# Patient Record
Sex: MALE | Race: WHITE | NOT HISPANIC OR LATINO | URBAN - METROPOLITAN AREA
[De-identification: names, ages, dates, MRNs, and addresses within clinical notes are randomized per-mention and may not be internally consistent; named-entity substitution may affect disease eponyms.]

---

## 2023-04-13 ENCOUNTER — INPATIENT (INPATIENT)
Facility: HOSPITAL | Age: 63
LOS: 4 days | Discharge: HOME HEALTH | DRG: 235 | End: 2023-04-18
Attending: THORACIC SURGERY (CARDIOTHORACIC VASCULAR SURGERY) | Admitting: THORACIC SURGERY (CARDIOTHORACIC VASCULAR SURGERY)
Payer: MEDICARE

## 2023-04-13 ENCOUNTER — TRANSCRIPTION ENCOUNTER (OUTPATIENT)
Age: 63
End: 2023-04-13

## 2023-04-13 VITALS
SYSTOLIC BLOOD PRESSURE: 160 MMHG | RESPIRATION RATE: 16 BRPM | OXYGEN SATURATION: 100 % | DIASTOLIC BLOOD PRESSURE: 96 MMHG

## 2023-04-13 LAB
A1C WITH ESTIMATED AVERAGE GLUCOSE RESULT: 5.8 % — HIGH (ref 4–5.6)
ALBUMIN SERPL ELPH-MCNC: 4.1 G/DL — SIGNIFICANT CHANGE UP (ref 3.3–5)
ALP SERPL-CCNC: 55 U/L — SIGNIFICANT CHANGE UP (ref 40–120)
ALT FLD-CCNC: 28 U/L — SIGNIFICANT CHANGE UP (ref 10–45)
ANION GAP SERPL CALC-SCNC: 11 MMOL/L — SIGNIFICANT CHANGE UP (ref 5–17)
APTT BLD: 26.7 SEC — LOW (ref 27.5–35.5)
AST SERPL-CCNC: 18 U/L — SIGNIFICANT CHANGE UP (ref 10–40)
BASOPHILS # BLD AUTO: 0.05 K/UL — SIGNIFICANT CHANGE UP (ref 0–0.2)
BASOPHILS NFR BLD AUTO: 0.5 % — SIGNIFICANT CHANGE UP (ref 0–2)
BILIRUB SERPL-MCNC: 0.3 MG/DL — SIGNIFICANT CHANGE UP (ref 0.2–1.2)
BUN SERPL-MCNC: 16 MG/DL — SIGNIFICANT CHANGE UP (ref 7–23)
CALCIUM SERPL-MCNC: 9.4 MG/DL — SIGNIFICANT CHANGE UP (ref 8.4–10.5)
CHLORIDE SERPL-SCNC: 102 MMOL/L — SIGNIFICANT CHANGE UP (ref 96–108)
CHOLEST SERPL-MCNC: 137 MG/DL — SIGNIFICANT CHANGE UP
CK MB CFR SERPL CALC: 2 NG/ML — SIGNIFICANT CHANGE UP (ref 0–6.7)
CK SERPL-CCNC: 81 U/L — SIGNIFICANT CHANGE UP (ref 30–200)
CO2 SERPL-SCNC: 24 MMOL/L — SIGNIFICANT CHANGE UP (ref 22–31)
CREAT SERPL-MCNC: 1.13 MG/DL — SIGNIFICANT CHANGE UP (ref 0.5–1.3)
EGFR: 73 ML/MIN/1.73M2 — SIGNIFICANT CHANGE UP
EOSINOPHIL # BLD AUTO: 0.03 K/UL — SIGNIFICANT CHANGE UP (ref 0–0.5)
EOSINOPHIL NFR BLD AUTO: 0.3 % — SIGNIFICANT CHANGE UP (ref 0–6)
ESTIMATED AVERAGE GLUCOSE: 120 MG/DL — HIGH (ref 68–114)
GLUCOSE SERPL-MCNC: 146 MG/DL — HIGH (ref 70–99)
HCT VFR BLD CALC: 38.7 % — LOW (ref 39–50)
HDLC SERPL-MCNC: 42 MG/DL — SIGNIFICANT CHANGE UP
HGB BLD-MCNC: 13.2 G/DL — SIGNIFICANT CHANGE UP (ref 13–17)
IMM GRANULOCYTES NFR BLD AUTO: 0.3 % — SIGNIFICANT CHANGE UP (ref 0–0.9)
INR BLD: 1.04 — SIGNIFICANT CHANGE UP (ref 0.88–1.16)
LIPID PNL WITH DIRECT LDL SERPL: 66 MG/DL — SIGNIFICANT CHANGE UP
LYMPHOCYTES # BLD AUTO: 1.51 K/UL — SIGNIFICANT CHANGE UP (ref 1–3.3)
LYMPHOCYTES # BLD AUTO: 15.7 % — SIGNIFICANT CHANGE UP (ref 13–44)
MAGNESIUM SERPL-MCNC: 2.2 MG/DL — SIGNIFICANT CHANGE UP (ref 1.6–2.6)
MCHC RBC-ENTMCNC: 31.3 PG — SIGNIFICANT CHANGE UP (ref 27–34)
MCHC RBC-ENTMCNC: 34.1 GM/DL — SIGNIFICANT CHANGE UP (ref 32–36)
MCV RBC AUTO: 91.7 FL — SIGNIFICANT CHANGE UP (ref 80–100)
MONOCYTES # BLD AUTO: 0.76 K/UL — SIGNIFICANT CHANGE UP (ref 0–0.9)
MONOCYTES NFR BLD AUTO: 7.9 % — SIGNIFICANT CHANGE UP (ref 2–14)
NEUTROPHILS # BLD AUTO: 7.22 K/UL — SIGNIFICANT CHANGE UP (ref 1.8–7.4)
NEUTROPHILS NFR BLD AUTO: 75.3 % — SIGNIFICANT CHANGE UP (ref 43–77)
NON HDL CHOLESTEROL: 95 MG/DL — SIGNIFICANT CHANGE UP
NRBC # BLD: 0 /100 WBCS — SIGNIFICANT CHANGE UP (ref 0–0)
NT-PROBNP SERPL-SCNC: 134 PG/ML — SIGNIFICANT CHANGE UP (ref 0–300)
PA ADP PRP-ACNC: 247 PRU — SIGNIFICANT CHANGE UP (ref 194–418)
PHOSPHATE SERPL-MCNC: 3.2 MG/DL — SIGNIFICANT CHANGE UP (ref 2.5–4.5)
PLATELET # BLD AUTO: 323 K/UL — SIGNIFICANT CHANGE UP (ref 150–400)
POTASSIUM SERPL-MCNC: 4 MMOL/L — SIGNIFICANT CHANGE UP (ref 3.5–5.3)
POTASSIUM SERPL-SCNC: 4 MMOL/L — SIGNIFICANT CHANGE UP (ref 3.5–5.3)
PROT SERPL-MCNC: 7.1 G/DL — SIGNIFICANT CHANGE UP (ref 6–8.3)
PROTHROM AB SERPL-ACNC: 12.4 SEC — SIGNIFICANT CHANGE UP (ref 10.5–13.4)
RBC # BLD: 4.22 M/UL — SIGNIFICANT CHANGE UP (ref 4.2–5.8)
RBC # FLD: 12.8 % — SIGNIFICANT CHANGE UP (ref 10.3–14.5)
RH IG SCN BLD-IMP: POSITIVE — SIGNIFICANT CHANGE UP
RH IG SCN BLD-IMP: POSITIVE — SIGNIFICANT CHANGE UP
SODIUM SERPL-SCNC: 137 MMOL/L — SIGNIFICANT CHANGE UP (ref 135–145)
TRIGL SERPL-MCNC: 146 MG/DL — SIGNIFICANT CHANGE UP
TROPONIN T SERPL-MCNC: 0.01 NG/ML — SIGNIFICANT CHANGE UP (ref 0–0.01)
WBC # BLD: 9.6 K/UL — SIGNIFICANT CHANGE UP (ref 3.8–10.5)
WBC # FLD AUTO: 9.6 K/UL — SIGNIFICANT CHANGE UP (ref 3.8–10.5)

## 2023-04-13 PROCEDURE — 99223 1ST HOSP IP/OBS HIGH 75: CPT | Mod: 57

## 2023-04-13 PROCEDURE — 71045 X-RAY EXAM CHEST 1 VIEW: CPT | Mod: 26

## 2023-04-13 PROCEDURE — 93010 ELECTROCARDIOGRAM REPORT: CPT | Mod: 77

## 2023-04-13 RX ORDER — CHLORHEXIDINE GLUCONATE 213 G/1000ML
1 SOLUTION TOPICAL ONCE
Refills: 0 | Status: COMPLETED | OUTPATIENT
Start: 2023-04-13 | End: 2023-04-14

## 2023-04-13 RX ORDER — CHLORHEXIDINE GLUCONATE 213 G/1000ML
1 SOLUTION TOPICAL ONCE
Refills: 0 | Status: COMPLETED | OUTPATIENT
Start: 2023-04-13 | End: 2023-04-13

## 2023-04-13 RX ORDER — SODIUM CHLORIDE 9 MG/ML
3 INJECTION INTRAMUSCULAR; INTRAVENOUS; SUBCUTANEOUS EVERY 8 HOURS
Refills: 0 | Status: DISCONTINUED | OUTPATIENT
Start: 2023-04-13 | End: 2023-04-14

## 2023-04-13 RX ORDER — CHLORHEXIDINE GLUCONATE 213 G/1000ML
30 SOLUTION TOPICAL ONCE
Refills: 0 | Status: DISCONTINUED | OUTPATIENT
Start: 2023-04-13 | End: 2023-04-14

## 2023-04-13 RX ADMIN — SODIUM CHLORIDE 3 MILLILITER(S): 9 INJECTION INTRAMUSCULAR; INTRAVENOUS; SUBCUTANEOUS at 23:27

## 2023-04-13 RX ADMIN — CHLORHEXIDINE GLUCONATE 1 APPLICATION(S): 213 SOLUTION TOPICAL at 23:29

## 2023-04-13 NOTE — H&P ADULT - ASSESSMENT
Neurovascular: No delirium. Pain well controlled with current regimen.  -PRN's.    Cardiovascular: Hemodynamically stable. HR controlled.  -BP. HR. EKG. TTE. Cardiac Panel. Lipid Panel. BNP.   -ASA. Plavix. BBlocker. Statin.      Respiratory: 02 Sat = 98% on RA.  -If on oxygen wean to RA from for O2 Sat > 93%.  -Encourage C+DB and Use of IS 10x / hr while awake.  -CXR.    GI: Stable.  -NPO after MN.  -PPX.  -PO Diet.    Renal / :  -Monitor renal function.  -Monitor I/O's.    Endocrine:    -A1c.  -TSH.    Hematologic:  -CBC.  -Coagulation Panel.  -T/Sx2.    ID:  -Temperature.  -CBC.  -Observe for SIRS/Sepsis Syndrome.    Prophylaxis:  -DVT prophylaxis with 5000 SubQ Heparin q8h.  -SCD's    Disposition:  -9L for frequent monitoring.  Patient is a 63 year male with history of HTN, HLD, SVT, systolic CHF with improved LVEF, CAD s/p PCI LPDA in June 2020 who was sent to the ER at [facility] on 04/13/2023 by his cardiologist for having a positive stress test. Patient explains he has been having intermittent DOCKERY for the last few weeks. He also states he has had some mild chest discomfort which he attributed to dyspepsia as it is sometimes accompanied by a burning in his stomach. During the stress test in office today patient developed limiting pain in his shoulders and back and was found to have profound ST depressions. Cardiac Catheterization completed showing >90% proximal LAD stenosis at a trifurcation with a high-rising D1, and distal left main with moderate disease. Mild LCx. Non-dominant RCA. Arterial hemostasis with 6FR Perclose. Patient now transferred to [facility] for further work up and MGMT under the care of Dr. Hernandez. Patient currently without complaints    Neurovascular: No delirium. Pain well controlled with current regimen.  -PRN's.    Cardiovascular: Hemodynamically stable. HR controlled. CAD. CAD s/p PCI x1 to LPDA (June 2020). Systolic CHF- with improved LVEF (11/2021- LVEF 50-55%). SVT/ PVC's. HTN. HLD.   -PST.  -Amlodipine 10 Mg PO QPM   -Isordil 5 Mg PO TID   - Hold Hydralazine 25 Mg PO TID   -Atorvastatin 40 Mg PO QHS   -Spironolactone 25 Mg PO QAM   -Metoprolol  25 Mg PO q12h   -Aspirin 81 Mg PO Q BREAKFAST     Respiratory: 02 Sat = 98% on RA.  -If on oxygen wean to RA from for O2 Sat > 93%.  -Encourage C+DB and Use of IS 10x / hr while awake.  -CXR.    GI: Stable.  -NPO after MN.  -PPX.  -PO Diet.    Renal / :  -Monitor renal function.  -Monitor I/O's.    Endocrine:    -A1c.  -TSH.    Hematologic:  -CBC.  -Coagulation Panel.  -T/Sx2.    ID:  -Temperature.  -CBC.  -Observe for SIRS/Sepsis Syndrome.    Prophylaxis:  -DVT prophylaxis with 5000 SubQ Heparin q8h.  -SCD's.    Disposition:  -9L for frequent monitoring.

## 2023-04-13 NOTE — H&P ADULT - NSHPPHYSICALEXAM_GEN_ALL_CORE
Vital Signs  T(C): 37.1 (13 Apr 2023 22:28), Max: 37.1 (13 Apr 2023 22:28)  T(F): 98.8 (13 Apr 2023 22:28), Max: 98.8 (13 Apr 2023 22:28)  HR: 86 (13 Apr 2023 22:06) (86-86)  BP: 160/96 (13 Apr 2023 22:06) (160/96 - 160/96)  RR: 16 (13 Apr 2023 22:06) (16 - 16)  SpO2: 100% (13 Apr 2023 22:06) (100% - 100%)    O2 Parameters below as of 13 Apr 2023 22:06  Patient On (Oxygen Delivery Method): room air      Appearance: Normal	  HEENT:   Normal oral mucosa, PERRL, EOMI	  Neck: Supple, + JVD/ - JVD; Carotid Bruit   Cardiovascular: Normal S1 S2. No JVD. No murmurs.  Respiratory: Lungs clear to auscultation/Decreased Breath Sounds. No Rales, Rhonchi, Wheezing.	  Gastrointestinal:  Soft, non-tender, + BS.	  Skin: No rashes. No ecchymoses. No cyanosis.  Extremities: Normal range of motion, no clubbing, cyanosis or edema.  Vascular: Peripheral pulses palpable 2+ bilaterally.  Neurologic: Non-focal.  Psychiatry: A & O x 3, mood & affect appropriate. Vital Signs  T(C): 37.1 (13 Apr 2023 22:28), Max: 37.1 (13 Apr 2023 22:28)  T(F): 98.8 (13 Apr 2023 22:28), Max: 98.8 (13 Apr 2023 22:28)  HR: 86 (13 Apr 2023 22:06) (86-86)  BP: 160/96 (13 Apr 2023 22:06) (160/96 - 160/96)  RR: 16 (13 Apr 2023 22:06) (16 - 16)  SpO2: 100% (13 Apr 2023 22:06) (100% - 100%)    O2 Parameters below as of 13 Apr 2023 22:06  Patient On (Oxygen Delivery Method): room air    Appearance: Normal	  HEENT:   Normal oral mucosa, PERRL, EOMI	  Neck: Supple, - JVD; Carotid Bruit   Cardiovascular: Normal S1 S2. No JVD. ? murmurs.  Respiratory: Lungs clear to auscultation B/L. No Rales, Rhonchi, Wheezing.	  Gastrointestinal:  Soft, non-tender, + BS.	  Skin: No rashes. No ecchymoses. No cyanosis.  Extremities: Normal range of motion, no clubbing, cyanosis or edema.  Vascular: Peripheral pulses palpable 2+ bilaterally.  Neurologic: Non-focal.  Psychiatry: A & O x 3, mood & affect appropriate.     Appearance: Normal	  HEENT:   Normal oral mucosa, PERRL, EOMI	  Neck: Supple, + JVD/ - JVD; Carotid Bruit   Cardiovascular: Normal S1 S2. No JVD. No murmurs.  Respiratory: Lungs clear to auscultation/Decreased Breath Sounds. No Rales, Rhonchi, Wheezing.	  Gastrointestinal:  Soft, non-tender, + BS.	  Skin: No rashes. No ecchymoses. No cyanosis.  Extremities: Normal range of motion, no clubbing, cyanosis or edema.  Vascular: Peripheral pulses palpable 2+ bilaterally.  Neurologic: Non-focal.  Psychiatry: A & O x 3, mood & affect appropriate.

## 2023-04-13 NOTE — H&P ADULT - NSHPREVIEWOFSYSTEMS_GEN_ALL_CORE
CONSTITUTIONAL: No fevers / chills, sweats, fatigue, weight loss, weight gain  NEUROLOGICAL: No parathesias, seizures, syncope, confusion  EYES: No blurry vision, discharge, pain, loss of vision  ENMT: No difficulty hearing, vertigo, dysphagia, epistaxis, recent dental work  CV: No chest pain, palpitations, DOCKERY, orthopnea  RESPIRATORY:  No wheezing, SOB, cough / sputum, hemoptysis  GI: No nausea, vomiting, diarrhea, constipation, melena  : No hematuria, dysuria, urgency, incontinence  MUSCULOSKELETAL: No arthritis, joint swelling, muscle weakness  SKIN/BREAST: No rash, itching, hair loss, masses  PSYCHIATRY: No depression, anxiety, suicidal ideation  HEMATOLOGY:  No bruising easily, enlarged lymph nodes, tender lymph nodes  ENDOCRINE: No cold intolerance, heat intolerance, polydipsia CONSTITUTIONAL: No fevers / chills, sweats, fatigue, weight loss, weight gain  NEUROLOGICAL: No parathesias, seizures, syncope, confusion  EYES: No blurry vision, discharge, pain, loss of vision  ENMT: No difficulty hearing, vertigo, dysphagia, epistaxis, recent dental work  CV: No current chest pain, palpitations, DOCKERY, orthopnea  RESPIRATORY:  No wheezing, SOB, cough / sputum, hemoptysis  GI: No nausea, vomiting, diarrhea, constipation, melena  : No hematuria, dysuria, urgency, incontinence  MUSCULOSKELETAL: No arthritis, joint swelling, muscle weakness  SKIN/BREAST: No rash, itching, hair loss, masses  PSYCHIATRY: No depression, anxiety, suicidal ideation  HEMATOLOGY:  No bruising easily, enlarged lymph nodes, tender lymph nodes  ENDOCRINE: No cold intolerance, heat intolerance, polydipsia

## 2023-04-13 NOTE — H&P ADULT - HISTORY OF PRESENT ILLNESS
Patient is a 63 year male with history of HTN, HLD, SVT, systolic CHF with improved LVEF, CAD s/p PCI LPDA in June 2020 who was sent to the ER at [facility] on 04/13/2023 by his cardiologist for having a positive stress test. Patient explains he has been having intermittent DOCKERY for the last few weeks. He also states he has had some mild chest discomfort which he attributed to dyspepsia as it is sometimes accompanied by a burning in his stomach. During the stress test in office today patient developed limiting pain in his shoulders and back and was found to have profound ST depressions. Cardiac Catheterization completed showing >90% proximal LAD stenosis at a trifurcation with a high-rising D1, and distal left main with moderate  disease. Mild LCx. Non-dominant RCA. Arterial hemostasis with 6FR Perclose. Pateint now transfered to [facility] for further work upm and MGMT under the care of Dr. Hernandez.     Other Imaging:   Exercise stress test 04/13/23: Patient exercised for 4 minutes 45 seconds of the Gregorio protocol   developed limiting pain in his shoulders and back. He had profoundgreater than 3 mm downsloping ST depressions.   TTE 12/20/2022: Normal LV size/function, mild AR/MR, mild thoracic ectasia 4.2cm   EKG 4/13/2023: NSR 81bpm, poor R wave progression     PMH:   CAD s/p PCI x1 to LPDA (June 2020)   Systolic CHF- with improved LVEF (11/2021- LVEF 50-55%)   SVT/ PVC's   HTN   HLD   GERD with a h/o GI bleed s/p endovascular clipping (received blood   transfussion)     PSH:   None     Social History:   No Smoking; Alcohol - Socially. No IVRDU.     Family History:   Mother - MI age 48, Siblings - brother: MI at age 42, Other - grandfather: MI age 62     Home Rx.:  Amlodipine 10 Mg PO QPM   Isordil 5 Mg PO TID   Hydralazine 25 Mg PO TID   Atorvastatin 40 Mg PO QHS   Pantoprazole 40 Mg PO BID   Spironolactone 25 Mg PO QAM   Metoprolol XL 25 Mg PO QAM   Aspirin 81 Mg PO Q BREAKFAST     Coded Allergies:   Lisinopril (Severe, Angioedema)    Patient is a 63 year male with history of HTN, HLD, SVT, systolic CHF with improved LVEF, CAD s/p PCI LPDA in June 2020 who was sent to the ER at [facility] on 04/13/2023 by his cardiologist for having a positive stress test. Patient explains he has been having intermittent DOCKERY for the last few weeks. He also states he has had some mild chest discomfort which he attributed to dyspepsia as it is sometimes accompanied by a burning in his stomach. During the stress test in office today patient developed limiting pain in his shoulders and back and was found to have profound ST depressions. Cardiac Catheterization completed showing >90% proximal LAD stenosis at a trifurcation with a high-rising D1, and distal left main with moderate disease. Mild LCx. Non-dominant RCA. Arterial hemostasis with 6FR Perclose. Patient now transferred to [facility] for further work up and MGMT under the care of Dr. Hernandez. Patient currently without complaints.     Other Imaging:   Exercise stress test 04/13/23: Patient exercised for 4 minutes 45 seconds of the Gregorio protocol   developed limiting pain in his shoulders and back. He had profound greater than 3 mm downsloping ST depressions.   TTE 12/20/2022: Normal LV size/function, mild AR/MR, mild thoracic ectasia 4.2cm   EKG 4/13/2023: NSR 81bpm, poor R wave progression     PMH:   CAD s/p PCI x1 to LPDA (June 2020)   Systolic CHF- with improved LVEF (11/2021- LVEF 50-55%)   SVT/ PVC's   HTN   HLD   GERD with a h/o GI bleed s/p endovascular clipping (received blood transfusion)  PSH:   None     Social History:   No Smoking; Alcohol - Socially. No IVRDU. Lives alone.     Family History:   Mother - MI age 48, Siblings - brother: MI at age 42, Other - grandfather: MI age 62     Home Rx.:  Amlodipine 10 Mg PO QPM   Isordil 5 Mg PO TID   Hydralazine 25 Mg PO TID   Atorvastatin 40 Mg PO QHS   Pantoprazole 40 Mg PO BID   Spironolactone 25 Mg PO QAM   Metoprolol XL 25 Mg PO QAM   Aspirin 81 Mg PO Q BREAKFAST     Coded Allergies:   Lisinopril (Severe, Angioedema)

## 2023-04-14 ENCOUNTER — APPOINTMENT (OUTPATIENT)
Dept: CARDIOTHORACIC SURGERY | Facility: HOSPITAL | Age: 63
End: 2023-04-14

## 2023-04-14 PROBLEM — Z00.00 ENCOUNTER FOR PREVENTIVE HEALTH EXAMINATION: Status: ACTIVE | Noted: 2023-04-14

## 2023-04-14 LAB
ALBUMIN SERPL ELPH-MCNC: 3.1 G/DL — LOW (ref 3.3–5)
ALBUMIN SERPL ELPH-MCNC: 3.8 G/DL — SIGNIFICANT CHANGE UP (ref 3.3–5)
ALBUMIN SERPL ELPH-MCNC: 4.3 G/DL — SIGNIFICANT CHANGE UP (ref 3.3–5)
ALP SERPL-CCNC: 43 U/L — SIGNIFICANT CHANGE UP (ref 40–120)
ALP SERPL-CCNC: 54 U/L — SIGNIFICANT CHANGE UP (ref 40–120)
ALP SERPL-CCNC: 62 U/L — SIGNIFICANT CHANGE UP (ref 40–120)
ALT FLD-CCNC: 18 U/L — SIGNIFICANT CHANGE UP (ref 10–45)
ALT FLD-CCNC: 25 U/L — SIGNIFICANT CHANGE UP (ref 10–45)
ALT FLD-CCNC: 28 U/L — SIGNIFICANT CHANGE UP (ref 10–45)
ANION GAP SERPL CALC-SCNC: 13 MMOL/L — SIGNIFICANT CHANGE UP (ref 5–17)
ANION GAP SERPL CALC-SCNC: 7 MMOL/L — SIGNIFICANT CHANGE UP (ref 5–17)
ANION GAP SERPL CALC-SCNC: 9 MMOL/L — SIGNIFICANT CHANGE UP (ref 5–17)
APPEARANCE UR: CLEAR — SIGNIFICANT CHANGE UP
APTT BLD: 26.5 SEC — LOW (ref 27.5–35.5)
APTT BLD: 28.2 SEC — SIGNIFICANT CHANGE UP (ref 27.5–35.5)
APTT BLD: 29.4 SEC — SIGNIFICANT CHANGE UP (ref 27.5–35.5)
AST SERPL-CCNC: 14 U/L — SIGNIFICANT CHANGE UP (ref 10–40)
AST SERPL-CCNC: 17 U/L — SIGNIFICANT CHANGE UP (ref 10–40)
AST SERPL-CCNC: 19 U/L — SIGNIFICANT CHANGE UP (ref 10–40)
BASE EXCESS BLDA CALC-SCNC: -0.4 MMOL/L — SIGNIFICANT CHANGE UP (ref -2–3)
BASE EXCESS BLDA CALC-SCNC: -2.3 MMOL/L — LOW (ref -2–3)
BASE EXCESS BLDA CALC-SCNC: -2.7 MMOL/L — LOW (ref -2–3)
BASE EXCESS BLDA CALC-SCNC: -3.4 MMOL/L — LOW (ref -2–3)
BASOPHILS # BLD AUTO: 0.05 K/UL — SIGNIFICANT CHANGE UP (ref 0–0.2)
BASOPHILS NFR BLD AUTO: 0.3 % — SIGNIFICANT CHANGE UP (ref 0–2)
BILIRUB SERPL-MCNC: 0.3 MG/DL — SIGNIFICANT CHANGE UP (ref 0.2–1.2)
BILIRUB SERPL-MCNC: 0.5 MG/DL — SIGNIFICANT CHANGE UP (ref 0.2–1.2)
BILIRUB SERPL-MCNC: 0.6 MG/DL — SIGNIFICANT CHANGE UP (ref 0.2–1.2)
BILIRUB UR-MCNC: NEGATIVE — SIGNIFICANT CHANGE UP
BLD GP AB SCN SERPL QL: NEGATIVE — SIGNIFICANT CHANGE UP
BUN SERPL-MCNC: 16 MG/DL — SIGNIFICANT CHANGE UP (ref 7–23)
BUN SERPL-MCNC: 16 MG/DL — SIGNIFICANT CHANGE UP (ref 7–23)
BUN SERPL-MCNC: 23 MG/DL — SIGNIFICANT CHANGE UP (ref 7–23)
CA-I BLDA-SCNC: 1.16 MMOL/L — SIGNIFICANT CHANGE UP (ref 1.15–1.33)
CA-I BLDA-SCNC: 1.17 MMOL/L — SIGNIFICANT CHANGE UP (ref 1.15–1.33)
CA-I BLDA-SCNC: 1.17 MMOL/L — SIGNIFICANT CHANGE UP (ref 1.15–1.33)
CA-I BLDA-SCNC: 1.22 MMOL/L — SIGNIFICANT CHANGE UP (ref 1.15–1.33)
CALCIUM SERPL-MCNC: 8 MG/DL — LOW (ref 8.4–10.5)
CALCIUM SERPL-MCNC: 8.6 MG/DL — SIGNIFICANT CHANGE UP (ref 8.4–10.5)
CALCIUM SERPL-MCNC: 9.6 MG/DL — SIGNIFICANT CHANGE UP (ref 8.4–10.5)
CHLORIDE SERPL-SCNC: 102 MMOL/L — SIGNIFICANT CHANGE UP (ref 96–108)
CHLORIDE SERPL-SCNC: 104 MMOL/L — SIGNIFICANT CHANGE UP (ref 96–108)
CHLORIDE SERPL-SCNC: 105 MMOL/L — SIGNIFICANT CHANGE UP (ref 96–108)
CO2 BLDA-SCNC: 22 MMOL/L — SIGNIFICANT CHANGE UP (ref 19–24)
CO2 BLDA-SCNC: 24 MMOL/L — SIGNIFICANT CHANGE UP (ref 19–24)
CO2 BLDA-SCNC: 24 MMOL/L — SIGNIFICANT CHANGE UP (ref 19–24)
CO2 BLDA-SCNC: 27 MMOL/L — HIGH (ref 19–24)
CO2 SERPL-SCNC: 21 MMOL/L — LOW (ref 22–31)
CO2 SERPL-SCNC: 22 MMOL/L — SIGNIFICANT CHANGE UP (ref 22–31)
CO2 SERPL-SCNC: 24 MMOL/L — SIGNIFICANT CHANGE UP (ref 22–31)
COHGB MFR BLDA: 0.5 % — SIGNIFICANT CHANGE UP
COHGB MFR BLDA: 0.6 % — SIGNIFICANT CHANGE UP
COHGB MFR BLDA: 0.7 % — SIGNIFICANT CHANGE UP
COHGB MFR BLDA: 1 % — SIGNIFICANT CHANGE UP
COLOR SPEC: YELLOW — SIGNIFICANT CHANGE UP
CREAT SERPL-MCNC: 1 MG/DL — SIGNIFICANT CHANGE UP (ref 0.5–1.3)
CREAT SERPL-MCNC: 1.06 MG/DL — SIGNIFICANT CHANGE UP (ref 0.5–1.3)
CREAT SERPL-MCNC: 1.18 MG/DL — SIGNIFICANT CHANGE UP (ref 0.5–1.3)
DIFF PNL FLD: NEGATIVE — SIGNIFICANT CHANGE UP
EGFR: 70 ML/MIN/1.73M2 — SIGNIFICANT CHANGE UP
EGFR: 79 ML/MIN/1.73M2 — SIGNIFICANT CHANGE UP
EGFR: 85 ML/MIN/1.73M2 — SIGNIFICANT CHANGE UP
EOSINOPHIL # BLD AUTO: 0.03 K/UL — SIGNIFICANT CHANGE UP (ref 0–0.5)
EOSINOPHIL NFR BLD AUTO: 0.2 % — SIGNIFICANT CHANGE UP (ref 0–6)
GAS PNL BLDA: SIGNIFICANT CHANGE UP
GLUCOSE BLDA-MCNC: 127 MG/DL — HIGH (ref 70–99)
GLUCOSE BLDA-MCNC: 165 MG/DL — HIGH (ref 70–99)
GLUCOSE BLDA-MCNC: 167 MG/DL — HIGH (ref 70–99)
GLUCOSE BLDA-MCNC: 172 MG/DL — HIGH (ref 70–99)
GLUCOSE BLDC GLUCOMTR-MCNC: 143 MG/DL — HIGH (ref 70–99)
GLUCOSE SERPL-MCNC: 123 MG/DL — HIGH (ref 70–99)
GLUCOSE SERPL-MCNC: 131 MG/DL — HIGH (ref 70–99)
GLUCOSE SERPL-MCNC: 215 MG/DL — HIGH (ref 70–99)
GLUCOSE UR QL: NEGATIVE — SIGNIFICANT CHANGE UP
HCO3 BLDA-SCNC: 21 MMOL/L — SIGNIFICANT CHANGE UP (ref 21–28)
HCO3 BLDA-SCNC: 22 MMOL/L — SIGNIFICANT CHANGE UP (ref 21–28)
HCO3 BLDA-SCNC: 23 MMOL/L — SIGNIFICANT CHANGE UP (ref 21–28)
HCO3 BLDA-SCNC: 25 MMOL/L — SIGNIFICANT CHANGE UP (ref 21–28)
HCT VFR BLD CALC: 32.6 % — LOW (ref 39–50)
HCT VFR BLD CALC: 37.8 % — LOW (ref 39–50)
HCT VFR BLD CALC: 43 % — SIGNIFICANT CHANGE UP (ref 39–50)
HCV AB S/CO SERPL IA: 0.1 S/CO — SIGNIFICANT CHANGE UP (ref 0–0.99)
HCV AB SERPL-IMP: SIGNIFICANT CHANGE UP
HGB BLD-MCNC: 10.5 G/DL — LOW (ref 13–17)
HGB BLD-MCNC: 12.7 G/DL — LOW (ref 13–17)
HGB BLD-MCNC: 14.2 G/DL — SIGNIFICANT CHANGE UP (ref 13–17)
HGB BLDA-MCNC: 12.3 G/DL — LOW (ref 12.6–17.4)
HGB BLDA-MCNC: 12.3 G/DL — LOW (ref 12.6–17.4)
HGB BLDA-MCNC: 12.6 G/DL — SIGNIFICANT CHANGE UP (ref 12.6–17.4)
HGB BLDA-MCNC: 12.7 G/DL — SIGNIFICANT CHANGE UP (ref 12.6–17.4)
IMM GRANULOCYTES NFR BLD AUTO: 0.9 % — SIGNIFICANT CHANGE UP (ref 0–0.9)
INR BLD: 1.05 — SIGNIFICANT CHANGE UP (ref 0.88–1.16)
INR BLD: 1.09 — SIGNIFICANT CHANGE UP (ref 0.88–1.16)
INR BLD: 1.26 — HIGH (ref 0.88–1.16)
ISTAT ARTERIAL BE: -5 MMOL/L — LOW (ref -2–3)
ISTAT ARTERIAL BE: -8 MMOL/L — LOW (ref -2–3)
ISTAT ARTERIAL GLUCOSE: 107 MG/DL — HIGH (ref 70–99)
ISTAT ARTERIAL GLUCOSE: 121 MG/DL — HIGH (ref 70–99)
ISTAT ARTERIAL HCO3: 20 MMOL/L — LOW (ref 22–26)
ISTAT ARTERIAL HCO3: 22 MMOL/L — SIGNIFICANT CHANGE UP (ref 22–26)
ISTAT ARTERIAL HEMATOCRIT: 29 % — LOW (ref 39–50)
ISTAT ARTERIAL HEMATOCRIT: 37 % — LOW (ref 39–50)
ISTAT ARTERIAL HEMOGLOBIN: 12.6 G/DL — LOW (ref 13–17)
ISTAT ARTERIAL HEMOGLOBIN: 9.9 G/DL — LOW (ref 13–17)
ISTAT ARTERIAL IONIZED CALCIUM: 1.13 MMOL/L — SIGNIFICANT CHANGE UP (ref 1.12–1.3)
ISTAT ARTERIAL IONIZED CALCIUM: 1.19 MMOL/L — SIGNIFICANT CHANGE UP (ref 1.12–1.3)
ISTAT ARTERIAL PCO2: 45 MMHG — SIGNIFICANT CHANGE UP (ref 35–45)
ISTAT ARTERIAL PCO2: 55 MMHG — HIGH (ref 35–45)
ISTAT ARTERIAL PH: 7.18 — CRITICAL LOW (ref 7.35–7.45)
ISTAT ARTERIAL PH: 7.29 — LOW (ref 7.35–7.45)
ISTAT ARTERIAL PO2: 75 MMHG — LOW (ref 80–105)
ISTAT ARTERIAL PO2: <66 MMHG — LOW (ref 80–105)
ISTAT ARTERIAL POTASSIUM: 4 MMOL/L — SIGNIFICANT CHANGE UP (ref 3.5–5.3)
ISTAT ARTERIAL POTASSIUM: 4.3 MMOL/L — SIGNIFICANT CHANGE UP (ref 3.5–5.3)
ISTAT ARTERIAL SO2: 86 % — LOW (ref 95–98)
ISTAT ARTERIAL SO2: 93 % — LOW (ref 95–98)
ISTAT ARTERIAL SODIUM: 138 MMOL/L — SIGNIFICANT CHANGE UP (ref 135–145)
ISTAT ARTERIAL SODIUM: 140 MMOL/L — SIGNIFICANT CHANGE UP (ref 135–145)
ISTAT ARTERIAL TCO2: 22 MMOL/L — SIGNIFICANT CHANGE UP (ref 22–31)
ISTAT ARTERIAL TCO2: 23 MMOL/L — SIGNIFICANT CHANGE UP (ref 22–31)
KETONES UR-MCNC: NEGATIVE — SIGNIFICANT CHANGE UP
LEUKOCYTE ESTERASE UR-ACNC: NEGATIVE — SIGNIFICANT CHANGE UP
LYMPHOCYTES # BLD AUTO: 1.33 K/UL — SIGNIFICANT CHANGE UP (ref 1–3.3)
LYMPHOCYTES # BLD AUTO: 6.7 % — LOW (ref 13–44)
MAGNESIUM SERPL-MCNC: 1.6 MG/DL — SIGNIFICANT CHANGE UP (ref 1.6–2.6)
MAGNESIUM SERPL-MCNC: 1.7 MG/DL — SIGNIFICANT CHANGE UP (ref 1.6–2.6)
MAGNESIUM SERPL-MCNC: 2.2 MG/DL — SIGNIFICANT CHANGE UP (ref 1.6–2.6)
MCHC RBC-ENTMCNC: 31.1 PG — SIGNIFICANT CHANGE UP (ref 27–34)
MCHC RBC-ENTMCNC: 31.2 PG — SIGNIFICANT CHANGE UP (ref 27–34)
MCHC RBC-ENTMCNC: 31.4 PG — SIGNIFICANT CHANGE UP (ref 27–34)
MCHC RBC-ENTMCNC: 32.2 GM/DL — SIGNIFICANT CHANGE UP (ref 32–36)
MCHC RBC-ENTMCNC: 33 GM/DL — SIGNIFICANT CHANGE UP (ref 32–36)
MCHC RBC-ENTMCNC: 33.6 GM/DL — SIGNIFICANT CHANGE UP (ref 32–36)
MCV RBC AUTO: 93.6 FL — SIGNIFICANT CHANGE UP (ref 80–100)
MCV RBC AUTO: 94.1 FL — SIGNIFICANT CHANGE UP (ref 80–100)
MCV RBC AUTO: 96.7 FL — SIGNIFICANT CHANGE UP (ref 80–100)
METHGB MFR BLDA: 0.2 % — SIGNIFICANT CHANGE UP
METHGB MFR BLDA: 0.4 % — SIGNIFICANT CHANGE UP
METHGB MFR BLDA: 0.4 % — SIGNIFICANT CHANGE UP
METHGB MFR BLDA: 0.5 % — SIGNIFICANT CHANGE UP
MONOCYTES # BLD AUTO: 0.9 K/UL — SIGNIFICANT CHANGE UP (ref 0–0.9)
MONOCYTES NFR BLD AUTO: 4.5 % — SIGNIFICANT CHANGE UP (ref 2–14)
NEUTROPHILS # BLD AUTO: 17.34 K/UL — HIGH (ref 1.8–7.4)
NEUTROPHILS NFR BLD AUTO: 87.4 % — HIGH (ref 43–77)
NITRITE UR-MCNC: NEGATIVE — SIGNIFICANT CHANGE UP
NRBC # BLD: 0 /100 WBCS — SIGNIFICANT CHANGE UP (ref 0–0)
OXYHGB MFR BLDA: 96.4 % — HIGH (ref 90–95)
OXYHGB MFR BLDA: 96.9 % — HIGH (ref 90–95)
OXYHGB MFR BLDA: 97.9 % — HIGH (ref 90–95)
OXYHGB MFR BLDA: 98 % — HIGH (ref 90–95)
PCO2 BLDA: 37 MMHG — SIGNIFICANT CHANGE UP (ref 35–48)
PCO2 BLDA: 38 MMHG — SIGNIFICANT CHANGE UP (ref 35–48)
PCO2 BLDA: 40 MMHG — SIGNIFICANT CHANGE UP (ref 35–48)
PCO2 BLDA: 45 MMHG — SIGNIFICANT CHANGE UP (ref 35–48)
PH BLDA: 7.36 — SIGNIFICANT CHANGE UP (ref 7.35–7.45)
PH BLDA: 7.36 — SIGNIFICANT CHANGE UP (ref 7.35–7.45)
PH BLDA: 7.37 — SIGNIFICANT CHANGE UP (ref 7.35–7.45)
PH BLDA: 7.38 — SIGNIFICANT CHANGE UP (ref 7.35–7.45)
PH UR: 6 — SIGNIFICANT CHANGE UP (ref 5–8)
PHOSPHATE SERPL-MCNC: 3.1 MG/DL — SIGNIFICANT CHANGE UP (ref 2.5–4.5)
PHOSPHATE SERPL-MCNC: 3.3 MG/DL — SIGNIFICANT CHANGE UP (ref 2.5–4.5)
PHOSPHATE SERPL-MCNC: 4.3 MG/DL — SIGNIFICANT CHANGE UP (ref 2.5–4.5)
PLATELET # BLD AUTO: 253 K/UL — SIGNIFICANT CHANGE UP (ref 150–400)
PLATELET # BLD AUTO: 290 K/UL — SIGNIFICANT CHANGE UP (ref 150–400)
PLATELET # BLD AUTO: 343 K/UL — SIGNIFICANT CHANGE UP (ref 150–400)
PO2 BLDA: 104 MMHG — SIGNIFICANT CHANGE UP (ref 83–108)
PO2 BLDA: 166 MMHG — HIGH (ref 83–108)
PO2 BLDA: 323 MMHG — HIGH (ref 83–108)
PO2 BLDA: 89 MMHG — SIGNIFICANT CHANGE UP (ref 83–108)
POTASSIUM BLDA-SCNC: 4.6 MMOL/L — SIGNIFICANT CHANGE UP (ref 3.5–5.1)
POTASSIUM BLDA-SCNC: 4.7 MMOL/L — SIGNIFICANT CHANGE UP (ref 3.5–5.1)
POTASSIUM BLDA-SCNC: 4.8 MMOL/L — SIGNIFICANT CHANGE UP (ref 3.5–5.1)
POTASSIUM BLDA-SCNC: 5 MMOL/L — SIGNIFICANT CHANGE UP (ref 3.5–5.1)
POTASSIUM SERPL-MCNC: 3.7 MMOL/L — SIGNIFICANT CHANGE UP (ref 3.5–5.3)
POTASSIUM SERPL-MCNC: 4.4 MMOL/L — SIGNIFICANT CHANGE UP (ref 3.5–5.3)
POTASSIUM SERPL-MCNC: 4.6 MMOL/L — SIGNIFICANT CHANGE UP (ref 3.5–5.3)
POTASSIUM SERPL-SCNC: 3.7 MMOL/L — SIGNIFICANT CHANGE UP (ref 3.5–5.3)
POTASSIUM SERPL-SCNC: 4.4 MMOL/L — SIGNIFICANT CHANGE UP (ref 3.5–5.3)
POTASSIUM SERPL-SCNC: 4.6 MMOL/L — SIGNIFICANT CHANGE UP (ref 3.5–5.3)
PROT SERPL-MCNC: 5.3 G/DL — LOW (ref 6–8.3)
PROT SERPL-MCNC: 6.8 G/DL — SIGNIFICANT CHANGE UP (ref 6–8.3)
PROT SERPL-MCNC: 7.5 G/DL — SIGNIFICANT CHANGE UP (ref 6–8.3)
PROT UR-MCNC: NEGATIVE MG/DL — SIGNIFICANT CHANGE UP
PROTHROM AB SERPL-ACNC: 12.5 SEC — SIGNIFICANT CHANGE UP (ref 10.5–13.4)
PROTHROM AB SERPL-ACNC: 13 SEC — SIGNIFICANT CHANGE UP (ref 10.5–13.4)
PROTHROM AB SERPL-ACNC: 15 SEC — HIGH (ref 10.5–13.4)
RBC # BLD: 3.37 M/UL — LOW (ref 4.2–5.8)
RBC # BLD: 4.04 M/UL — LOW (ref 4.2–5.8)
RBC # BLD: 4.57 M/UL — SIGNIFICANT CHANGE UP (ref 4.2–5.8)
RBC # FLD: 12.7 % — SIGNIFICANT CHANGE UP (ref 10.3–14.5)
RBC # FLD: 12.8 % — SIGNIFICANT CHANGE UP (ref 10.3–14.5)
RBC # FLD: 13 % — SIGNIFICANT CHANGE UP (ref 10.3–14.5)
SAO2 % BLDA: 97.9 % — SIGNIFICANT CHANGE UP (ref 94–98)
SAO2 % BLDA: 98 % — SIGNIFICANT CHANGE UP (ref 94–98)
SAO2 % BLDA: 98.7 % — HIGH (ref 94–98)
SAO2 % BLDA: 98.8 % — HIGH (ref 94–98)
SODIUM BLDA-SCNC: 135 MMOL/L — LOW (ref 136–145)
SODIUM BLDA-SCNC: 136 MMOL/L — SIGNIFICANT CHANGE UP (ref 136–145)
SODIUM SERPL-SCNC: 135 MMOL/L — SIGNIFICANT CHANGE UP (ref 135–145)
SODIUM SERPL-SCNC: 136 MMOL/L — SIGNIFICANT CHANGE UP (ref 135–145)
SODIUM SERPL-SCNC: 136 MMOL/L — SIGNIFICANT CHANGE UP (ref 135–145)
SP GR SPEC: 1.02 — SIGNIFICANT CHANGE UP (ref 1–1.03)
TSH SERPL-MCNC: 1.47 UIU/ML — SIGNIFICANT CHANGE UP (ref 0.27–4.2)
UROBILINOGEN FLD QL: 0.2 E.U./DL — SIGNIFICANT CHANGE UP
WBC # BLD: 17.48 K/UL — HIGH (ref 3.8–10.5)
WBC # BLD: 19.82 K/UL — HIGH (ref 3.8–10.5)
WBC # BLD: 8.71 K/UL — SIGNIFICANT CHANGE UP (ref 3.8–10.5)
WBC # FLD AUTO: 17.48 K/UL — HIGH (ref 3.8–10.5)
WBC # FLD AUTO: 19.82 K/UL — HIGH (ref 3.8–10.5)
WBC # FLD AUTO: 8.71 K/UL — SIGNIFICANT CHANGE UP (ref 3.8–10.5)

## 2023-04-14 PROCEDURE — 71045 X-RAY EXAM CHEST 1 VIEW: CPT | Mod: 26

## 2023-04-14 PROCEDURE — 93010 ELECTROCARDIOGRAM REPORT: CPT

## 2023-04-14 PROCEDURE — 93880 EXTRACRANIAL BILAT STUDY: CPT | Mod: 26

## 2023-04-14 PROCEDURE — 93306 TTE W/DOPPLER COMPLETE: CPT | Mod: 26

## 2023-04-14 PROCEDURE — 99291 CRITICAL CARE FIRST HOUR: CPT

## 2023-04-14 PROCEDURE — 33533 CABG ARTERIAL SINGLE: CPT

## 2023-04-14 PROCEDURE — 93356 MYOCRD STRAIN IMG SPCKL TRCK: CPT

## 2023-04-14 PROCEDURE — 94010 BREATHING CAPACITY TEST: CPT | Mod: 26

## 2023-04-14 DEVICE — KIT CVC 3LUM SPECTRUM 9FR: Type: IMPLANTABLE DEVICE | Status: FUNCTIONAL

## 2023-04-14 DEVICE — CHEST DRAIN THORACIC PVC 28FR RIGHT ANGLE: Type: IMPLANTABLE DEVICE | Status: FUNCTIONAL

## 2023-04-14 DEVICE — SHUNT FLO-THRU INTRALUMINAL1.75MM X 18MM: Type: IMPLANTABLE DEVICE | Status: FUNCTIONAL

## 2023-04-14 DEVICE — TACHOSIL 4.8 X 4.8CM: Type: IMPLANTABLE DEVICE | Status: FUNCTIONAL

## 2023-04-14 RX ORDER — MAGNESIUM SULFATE 500 MG/ML
2 VIAL (ML) INJECTION ONCE
Refills: 0 | Status: COMPLETED | OUTPATIENT
Start: 2023-04-14 | End: 2023-04-14

## 2023-04-14 RX ORDER — DEXTROSE 50 % IN WATER 50 %
25 SYRINGE (ML) INTRAVENOUS
Refills: 0 | Status: DISCONTINUED | OUTPATIENT
Start: 2023-04-14 | End: 2023-04-15

## 2023-04-14 RX ORDER — INSULIN HUMAN 100 [IU]/ML
1 INJECTION, SOLUTION SUBCUTANEOUS
Qty: 50 | Refills: 0 | Status: DISCONTINUED | OUTPATIENT
Start: 2023-04-14 | End: 2023-04-15

## 2023-04-14 RX ORDER — ATORVASTATIN CALCIUM 80 MG/1
80 TABLET, FILM COATED ORAL AT BEDTIME
Refills: 0 | Status: DISCONTINUED | OUTPATIENT
Start: 2023-04-14 | End: 2023-04-18

## 2023-04-14 RX ORDER — SODIUM CHLORIDE 9 MG/ML
1000 INJECTION INTRAMUSCULAR; INTRAVENOUS; SUBCUTANEOUS
Refills: 0 | Status: DISCONTINUED | OUTPATIENT
Start: 2023-04-14 | End: 2023-04-18

## 2023-04-14 RX ORDER — CHLORHEXIDINE GLUCONATE 213 G/1000ML
1 SOLUTION TOPICAL ONCE
Refills: 0 | Status: DISCONTINUED | OUTPATIENT
Start: 2023-04-14 | End: 2023-04-15

## 2023-04-14 RX ORDER — CHLORHEXIDINE GLUCONATE 213 G/1000ML
15 SOLUTION TOPICAL EVERY 12 HOURS
Refills: 0 | Status: DISCONTINUED | OUTPATIENT
Start: 2023-04-14 | End: 2023-04-15

## 2023-04-14 RX ORDER — METOPROLOL TARTRATE 50 MG
25 TABLET ORAL EVERY 12 HOURS
Refills: 0 | Status: DISCONTINUED | OUTPATIENT
Start: 2023-04-14 | End: 2023-04-14

## 2023-04-14 RX ORDER — POTASSIUM CHLORIDE 20 MEQ
40 PACKET (EA) ORAL ONCE
Refills: 0 | Status: COMPLETED | OUTPATIENT
Start: 2023-04-14 | End: 2023-04-14

## 2023-04-14 RX ORDER — ASPIRIN/CALCIUM CARB/MAGNESIUM 324 MG
81 TABLET ORAL DAILY
Refills: 0 | Status: DISCONTINUED | OUTPATIENT
Start: 2023-04-14 | End: 2023-04-18

## 2023-04-14 RX ORDER — MEPERIDINE HYDROCHLORIDE 50 MG/ML
25 INJECTION INTRAMUSCULAR; INTRAVENOUS; SUBCUTANEOUS ONCE
Refills: 0 | Status: DISCONTINUED | OUTPATIENT
Start: 2023-04-14 | End: 2023-04-15

## 2023-04-14 RX ORDER — PANTOPRAZOLE SODIUM 20 MG/1
40 TABLET, DELAYED RELEASE ORAL DAILY
Refills: 0 | Status: DISCONTINUED | OUTPATIENT
Start: 2023-04-14 | End: 2023-04-18

## 2023-04-14 RX ORDER — CEFAZOLIN SODIUM 1 G
2000 VIAL (EA) INJECTION EVERY 8 HOURS
Refills: 0 | Status: COMPLETED | OUTPATIENT
Start: 2023-04-14 | End: 2023-04-16

## 2023-04-14 RX ORDER — DEXTROSE 50 % IN WATER 50 %
50 SYRINGE (ML) INTRAVENOUS
Refills: 0 | Status: DISCONTINUED | OUTPATIENT
Start: 2023-04-14 | End: 2023-04-15

## 2023-04-14 RX ORDER — ASPIRIN/CALCIUM CARB/MAGNESIUM 324 MG
81 TABLET ORAL DAILY
Refills: 0 | Status: DISCONTINUED | OUTPATIENT
Start: 2023-04-14 | End: 2023-04-14

## 2023-04-14 RX ORDER — ATORVASTATIN CALCIUM 80 MG/1
80 TABLET, FILM COATED ORAL AT BEDTIME
Refills: 0 | Status: DISCONTINUED | OUTPATIENT
Start: 2023-04-14 | End: 2023-04-14

## 2023-04-14 RX ORDER — HEPARIN SODIUM 5000 [USP'U]/ML
5000 INJECTION INTRAVENOUS; SUBCUTANEOUS EVERY 8 HOURS
Refills: 0 | Status: DISCONTINUED | OUTPATIENT
Start: 2023-04-14 | End: 2023-04-18

## 2023-04-14 RX ORDER — HEPARIN SODIUM 5000 [USP'U]/ML
5000 INJECTION INTRAVENOUS; SUBCUTANEOUS EVERY 8 HOURS
Refills: 0 | Status: DISCONTINUED | OUTPATIENT
Start: 2023-04-14 | End: 2023-04-14

## 2023-04-14 RX ORDER — CHLORHEXIDINE GLUCONATE 213 G/1000ML
5 SOLUTION TOPICAL
Refills: 0 | Status: DISCONTINUED | OUTPATIENT
Start: 2023-04-14 | End: 2023-04-15

## 2023-04-14 RX ORDER — POLYETHYLENE GLYCOL 3350 17 G/17G
17 POWDER, FOR SOLUTION ORAL DAILY
Refills: 0 | Status: DISCONTINUED | OUTPATIENT
Start: 2023-04-14 | End: 2023-04-18

## 2023-04-14 RX ORDER — AMLODIPINE BESYLATE 2.5 MG/1
10 TABLET ORAL DAILY
Refills: 0 | Status: DISCONTINUED | OUTPATIENT
Start: 2023-04-14 | End: 2023-04-14

## 2023-04-14 RX ORDER — CLOPIDOGREL BISULFATE 75 MG/1
75 TABLET, FILM COATED ORAL DAILY
Refills: 0 | Status: DISCONTINUED | OUTPATIENT
Start: 2023-04-14 | End: 2023-04-18

## 2023-04-14 RX ORDER — PANTOPRAZOLE SODIUM 20 MG/1
40 TABLET, DELAYED RELEASE ORAL
Refills: 0 | Status: DISCONTINUED | OUTPATIENT
Start: 2023-04-14 | End: 2023-04-14

## 2023-04-14 RX ORDER — SODIUM CHLORIDE 9 MG/ML
2000 INJECTION, SOLUTION INTRAVENOUS ONCE
Refills: 0 | Status: COMPLETED | OUTPATIENT
Start: 2023-04-14 | End: 2023-04-14

## 2023-04-14 RX ORDER — SENNA PLUS 8.6 MG/1
2 TABLET ORAL AT BEDTIME
Refills: 0 | Status: DISCONTINUED | OUTPATIENT
Start: 2023-04-14 | End: 2023-04-18

## 2023-04-14 RX ORDER — ISOSORBIDE DINITRATE 5 MG/1
5 TABLET ORAL THREE TIMES A DAY
Refills: 0 | Status: DISCONTINUED | OUTPATIENT
Start: 2023-04-14 | End: 2023-04-14

## 2023-04-14 RX ORDER — NALOXONE HYDROCHLORIDE 4 MG/.1ML
0.4 SPRAY NASAL ONCE
Refills: 0 | Status: COMPLETED | OUTPATIENT
Start: 2023-04-14 | End: 2023-04-14

## 2023-04-14 RX ADMIN — Medication 25 GRAM(S): at 20:45

## 2023-04-14 RX ADMIN — SODIUM CHLORIDE 1000 MILLILITER(S): 9 INJECTION, SOLUTION INTRAVENOUS at 19:00

## 2023-04-14 RX ADMIN — ATORVASTATIN CALCIUM 80 MILLIGRAM(S): 80 TABLET, FILM COATED ORAL at 23:35

## 2023-04-14 RX ADMIN — ISOSORBIDE DINITRATE 5 MILLIGRAM(S): 5 TABLET ORAL at 06:17

## 2023-04-14 RX ADMIN — Medication 100 MILLIGRAM(S): at 23:35

## 2023-04-14 RX ADMIN — CHLORHEXIDINE GLUCONATE 1 APPLICATION(S): 213 SOLUTION TOPICAL at 09:52

## 2023-04-14 RX ADMIN — HEPARIN SODIUM 5000 UNIT(S): 5000 INJECTION INTRAVENOUS; SUBCUTANEOUS at 06:17

## 2023-04-14 RX ADMIN — Medication 40 MILLIEQUIVALENT(S): at 09:50

## 2023-04-14 RX ADMIN — AMLODIPINE BESYLATE 10 MILLIGRAM(S): 2.5 TABLET ORAL at 10:56

## 2023-04-14 RX ADMIN — HEPARIN SODIUM 5000 UNIT(S): 5000 INJECTION INTRAVENOUS; SUBCUTANEOUS at 23:35

## 2023-04-14 RX ADMIN — SODIUM CHLORIDE 3 MILLILITER(S): 9 INJECTION INTRAMUSCULAR; INTRAVENOUS; SUBCUTANEOUS at 06:03

## 2023-04-14 RX ADMIN — CHLORHEXIDINE GLUCONATE 1 APPLICATION(S): 213 SOLUTION TOPICAL at 00:29

## 2023-04-14 RX ADMIN — ISOSORBIDE DINITRATE 5 MILLIGRAM(S): 5 TABLET ORAL at 10:56

## 2023-04-14 RX ADMIN — Medication 25 GRAM(S): at 23:36

## 2023-04-14 RX ADMIN — SENNA PLUS 2 TABLET(S): 8.6 TABLET ORAL at 23:35

## 2023-04-14 RX ADMIN — Medication 25 MILLIGRAM(S): at 06:17

## 2023-04-14 RX ADMIN — Medication 81 MILLIGRAM(S): at 11:19

## 2023-04-14 RX ADMIN — PANTOPRAZOLE SODIUM 40 MILLIGRAM(S): 20 TABLET, DELAYED RELEASE ORAL at 06:17

## 2023-04-14 RX ADMIN — NALOXONE HYDROCHLORIDE 0.4 MILLIGRAM(S): 4 SPRAY NASAL at 19:15

## 2023-04-14 NOTE — PRE-ANESTHESIA EVALUATION ADULT - NSANTHOSAYNRD_GEN_A_CORE
No. CHASIDY screening performed.  STOP BANG Legend: 0-2 = LOW Risk; 3-4 = INTERMEDIATE Risk; 5-8 = HIGH Risk

## 2023-04-14 NOTE — BRIEF OPERATIVE NOTE - NSICDXBRIEFPROCEDURE_GEN_ALL_CORE_FT
PROCEDURES:  Minimally invasive direct coronary artery bypass (MIDCAB) with transesophageal echocardiography (DOUG) 14-Apr-2023 17:57:05 MIDCAB (MACIAS-LAD), EF normal Dorothy Alarcon

## 2023-04-14 NOTE — PRE-OP CHECKLIST - NS PREOP CHK HIBICLENS NA
Reason for Disposition  • [1] SEVERE pain (e.g., excruciating, unable to walk) AND [2] not improved after 2 hours of pain medicine    Protocols used: KNEE PAIN-A-AH    
I agree with OBINNA burt  
Pt was standing in his kitchen about an hour ago and his knee gave out suddenly and he fell to the ground. His friends were able to lift him up to a chair and he is unable to stand. He is having pain with movement, no pain at rest. He cannot fully extend his leg and knee is swelling up.He has hx of multiple myeloma and bilateral knee replacements. He wants to know if he should go to [place] or ER. I advised ER as he does not think he will be able to take care of himself at home in his current state.  
#1:

## 2023-04-14 NOTE — PROGRESS NOTE ADULT - ASSESSMENT
63 M w/ HTN, SVT, sCHF and CAD s/p PCI 2020 with improved LVEF, admitted for surgical mgmt of CAD following a positive stress test and cardiac cath revealing > 90% prox LAD stenosis.  S/p Toni MIDCAB  Normal EF  4/14  Received extubated on no infusions   Hypercapnic and drowsy required 1 narcan injection and BiPap with improvement  A/p  Fully awake, alert and cooperative still on bipap-- awaiting repeat labs and gas to deescalate from PPV  denies Pain  Hemodynamically stable, in sinus and normotensive volume resuscitated earlier, autodiuresing well, lytes optimized   H&H and PLT in good range--> Drains with low output   A1c 5.8%/ glycemic conrol satisfactory-- SSC for stress hyperglycemia as needed   DAPT/statin-ICU ppx  Follow day 0-1  postop care    ATTENDING: I have personally and independently provided 90 Min of critical care services.

## 2023-04-14 NOTE — PRE-ANESTHESIA EVALUATION ADULT - NSANTHPMHFT_GEN_ALL_CORE
Patient is a 63 year male with history of HTN, HLD, SVT, systolic CHF with improved LVEF, CAD s/p PCI LPDA in June 2020 who was sent to the ER at [facility] on 04/13/2023 by his cardiologist for having a positive stress test. Patient explains he has been having intermittent DOCKERY for the last few weeks. He also states he has had some mild chest discomfort which he attributed to dyspepsia as it is sometimes accompanied by a burning in his stomach. During the stress test in office  patient developed limiting pain in his shoulders and back and was found to have profound ST depressions. Cardiac Catheterization completed showing >90% proximal LAD stenosis at a trifurcation with a high-rising D1, and distal left main with moderate disease. Mild LCx. Non-dominant RCA.

## 2023-04-14 NOTE — PRE-ANESTHESIA EVALUATION ADULT - NSRADCARDRESULTSFT_GEN_ALL_CORE
ECG: SR IWMI LAD ECG@ [facility]: SR IWMI LAD  EKG 4/13/2023: NSR 81bpm, poor R wave progression

## 2023-04-14 NOTE — BRIEF OPERATIVE NOTE - COMMENTS
I first assisted for the entirety of the case, including but not limited to robotic instrumentation, distal anastamoss, and closure.

## 2023-04-14 NOTE — PATIENT PROFILE ADULT - TRANSPORTATION
Group Therapy Checklist  Attendance: Attended  Attendance Duration (min):  (60)  Number of Participants: 9  Program/Group: Intensive Outpatient Program  Topics Covered: Assertiveness  Participation: Active verbal participation  Affect/Mood Range: Normal range, Flexible  Affect/Mood Display: Congruent w/content  Cognition: Alert, Oriented  Evidence of Imminent Suicide Risk: No  Evidence of imminent homicide risk: No  Therapeutic Interventions: Cognitive clarification, Communication skills  Progress Toward Treatment Goal: Mild improvement    
Group Therapy Checklist  Attendance: Attended  Attendance Duration (min):  (90)  Program/Group: Intensive Outpatient Program  Topics Covered:  (process group)  Participation: Active verbal participation, Attentive, Supportive to other group members, Open to feedback  Affect/Mood Range: Normal range, Flexible  Affect/Mood Display: Congruent w/content  Cognition: Alert, Oriented  Evidence of Imminent Suicide Risk: No  Evidence of imminent homicide risk: No  Therapeutic Interventions: Emotion clarification, Supportive psychotherapy  Progress Toward Treatment Goal: Mild improvement  Explored the archetype of the kimi in relation to current fears and self esteem issues that block empowerment and success. Receptive to peer support.     
no

## 2023-04-14 NOTE — PRE-ANESTHESIA EVALUATION ADULT - NSANTHTIREDRD_ENT_A_CORE
Procedures   Foot Exam       Assessment/Plan:  Pain upon ambulation   Edema   Mycosis of nail and skin      Plan   Patient educated on condition   All abnormal tissue debrided   Nails debrided   Patient will use OTC topical antifungal        No problem-specific Assessment & Plan notes found for this encounter  Review of Systems   Constitutional: Negative     HENT: Negative     Eyes: Negative     Respiratory: Negative     Cardiovascular: Negative     Gastrointestinal: Negative     Endocrine: Negative     Genitourinary: Negative     Musculoskeletal: Negative     Skin: Negative     Allergic/Immunologic: Negative     Neurological: Negative     Hematological: Negative     Psychiatric/Behavioral: Negative           Objective:      Physical Exam   Cardiovascular:   Pulses:       Dorsalis pedis pulses are 1+ on the right side, and 1+ on the left side         Posterior tibial pulses are 1+ on the right side, and 1+ on the left side      Assessment  1  Callus (700) (L84)   2  Diabetes mellitus with neuropathy (250 60,357 2) (E11 40)     Plan   · Follow-up visit in 9 weeks Evaluation and Treatment  Follow-up  Status: Hold For -  Scheduling  Requested for: [id number]   · Diabetes Foot Exam Performed; Status:Complete;   Done: 04TLK1622 03:44PM   · Inspect your feet daily ; Status:Complete;   Done: 66GUJ7095 03:44PM   · It is important to take good care of your feet if you have diabetes ; Status:Complete;    Done: 22FFV7800 03:44PM   · There are many things you can do at home to ensure good foot health ; Status:Complete;    Done: 80TZP8451 03:44PM   · Wear shoes that give your toes plenty of room ; Status:Complete;   Done: 71TMC4611  03:44PM   · *VB - Foot Exam; Status:Complete; Asif Bond: 27YEL1449 03:31PM     Discussion/Summary     Agent has increasing symptoms of neuropathy  We will add vitamin B complex     The patient was counseled regarding instructions for management,-patient and family education,-importance of compliance with treatment  Patient is able to Self-Care  The treatment plan was reviewed with the patient/guardian  The patient/guardian understands and agrees with the treatment plan      Chief Complaint  nail care      History of Present Illness  HPI: This patient a morbidly obese 31-year-old white male with diabetes mellitus, complains of pain within his feet  He has pain in the heels with walking  He has pain in in his toes with shoe wear  He has no history of trauma  He has not done any treatment area patient also is pain in his left arch  He is having a gouty attack      Active Problems  1  Acute gouty arthritis (274 01) (M10 9)   2  Atherosclerosis of arteries of extremities (440 20) (I70 209)   3  Callus (700) (L84)   4  Dermatomycosis (111 9) (B36 9)   5  Diabetes mellitus with neuropathy (250 60,357 2) (E11 40)   6  Diabetic neuropathy (250 60,357 2) (E11 40)   7  Onychogryphosis (703 8)   8  Onychomycosis (110 1) (B35 1)   9  Pes planus, unspecified laterality (734) (M21 40)   10  Tenderness in limb (729 5) (M79 609)     Social History   · Former smoker (L95 22) (Z87 891)   · Never Drank Alcohol     Current Meds   1  Colcrys 0 6 MG Oral Tablet; one tb po tid;   Therapy: 30TVR4913 to (Evaluate:53Hfy6902)  Requested for: 84QKQ6444; Last   Rx:[id number] Ordered   2  Colcrys 0 6 MG Oral Tablet; one tb po tid;  Karla Steven: 44FOQ4921 to (Ltanya Innocent)  Requested for: 35Nnz6185;  Last   Rx:[id number] Ordered   3  GlipiZIDE 10 MG Oral Tablet;   Therapy: (QWITSGQO:52UHB8928) to Recorded   4  Januvia 50 MG Oral Tablet;   Therapy: 75QNF6758 to Recorded   5  Levothyroxine Sodium 75 MCG Oral Tablet;   Therapy: 22ZFQ3676 to Recorded   6  Lisinopril 20 MG Oral Tablet;   Therapy: (Bran Merlos) to Recorded   7  MetFORMIN HCl - 500 MG Oral Tablet;   Therapy: (BKWQRRDD:80LOH8534) to Recorded   8  Multi-Vitamin TABS;   Therapy: (Recorded:62Zge7217) to Recorded   9  Pradaxa 150 MG Oral Capsule;   Therapy: (UBFUNLHR:67PYU1147) to Recorded   10  VESIcare 10 MG Oral Tablet;    Therapy: (Recorded:73Wza6797) to Recorded     The medication list was reviewed and updated today       Allergies  1  No Known Drug Allergies     Vitals        Heart Rate 88   Respiration 18   Systolic 455   Diastolic 82   Height 5 ft 10 in   Weight 288 lb    BMI Calculated 41 32   BSA Calculated 2 44      Physical Exam  Left Foot: Appearance: Normal except as noted: excessive pronation-and-pes planus  Right Foot: Appearance: Normal except as noted: excessive pronation-and-pes planus  Left Ankle: ROM: limited ROM in all planes Motor: diffuse weakness  Right Ankle: ROM: limited ROM in all planes Motor: diffuse weakness  Neurological Exam: performed  Light touch was absent bilaterally  Vibratory sensation was absent bilaterally  Deep tendon reflexes: achilles reflex One/4 bilaterally  Vascular Exam: performed Dorsalis pedis pulses were One/4 bilaterally  Posterior tibial pulses were One/4 bilaterally  Elevation Pallor: present bilaterally  Dependence rubor was absent bilaterally  Capillary refill time was Q  9, findings bilateral  Negative digital hair noted, but-between 1-3 seconds bilaterally  Edema: mild bilaterally-and-2/7 pitting edema  Negative Homans sign, bilateral    Toenails: All of the toenails were elongated,-hypertrophied,-discolored,-shown to have subungual debris,-tender-and-Mycotic with onychauxis       Socks and shoes removed, Right Foot Findings: swollen and erythematous, but no dryness  Diminished tactile sensation with monofilament testing throughout the right foot    Socks and shoes removed, Left Foot Findings: swollen, erythematous and dry  Diminished tactile sensation with monofilament testing throughout the left foot    Capillary refills findings on the right were delayed in the toes  Pulses:   1+ in the posterior tibialis on the right   1+ in the dorsalis pedis on the right     Capillary refills findings on the left were delayed in the toes    Pulses:   1+ in the posterior tibialis on the left   1+ in the dorsalis pedis on the left  Hyperkeratosis: present on both first toes-and-Bilateral plantar foot demonstrates cirrhosis of skin  There is eczema in his heels  There are fissures  There is no cellulitis  Shoe Gear Evaluation: performed ()     Procedure        Procedure: toenail debridement performed on all toenails   Indications for the procedure include professional performance of nail care required due to loss of sensation from diabetes  The procedure's risks were discussed with the patient  Procedure Note: The toenails were debrided using heavy-duty nail nippers-and-with a nail   Post-Procedure: the patient tolerated the procedure well  Complications: there were no complications  Bilateral plantar eczema was debrided down to normal levels  All mycotic nails debrided  Patient tolerated this well without complication her pain           Patient's shoes and socks removed  Right Foot/Ankle   Right Foot Inspection     Toe Exam: swelling  Sensory   Vibration: absent  Proprioception: absent   Monofilament testing: absent  Vascular     The right DP pulse is 1+  The right PT pulse is 1+       Left Foot/Ankle  Left Foot Inspection                 Toe Exam: swelling                   Sensory   Vibration: absent  Proprioception: absent     Vascular     The left DP pulse is 1+  The left PT pulse is 1+  Assign Risk Category:  Deformity present;  No loss of protective sensation;        Risk: 1       No

## 2023-04-14 NOTE — PRE-ANESTHESIA EVALUATION ADULT - NSANTHPROCED_GEN_ALL_CORE
Arterial Catheter/Central Venous Catheter/Transesophageal Echocardiogram possible REBEKAH/Arterial Catheter/Central Venous Catheter/Transesophageal Echocardiogram/Regional Block

## 2023-04-14 NOTE — PROGRESS NOTE ADULT - SUBJECTIVE AND OBJECTIVE BOX
INTERVAL COURSE  POD# 0   MIDCAB  Extubated prior to transfer, drowsy and hypoventilating-- GAS hypercapnic and acidotic  Improved with BiPAP- No infusions   Awake, alert and cooperative   Postop labs and Xray reviewed      VITALS  Vital Signs Last 24 Hrs  T(C): 35.9 (14 Apr 2023 20:53), Max: 37.1 (13 Apr 2023 22:28)  T(F): 96.6 (14 Apr 2023 20:53), Max: 98.8 (13 Apr 2023 22:28)  HR: 85 (14 Apr 2023 22:00) (72 - 95)  BP: 119/77 (14 Apr 2023 19:30) (117/76 - 122/72)  BP(mean): 93 (14 Apr 2023 19:30) (91 - 96)  RR: 20 (14 Apr 2023 22:00) (12 - 20)  SpO2: 96% (14 Apr 2023 22:00) (94% - 98%)    Parameters below as of 14 Apr 2023 22:00  Patient On (Oxygen Delivery Method): BiPAP/CPAP    O2 Concentration (%): 80    I&O's Summary  14 Apr 2023 07:01  -  14 Apr 2023 22:23  --------------------------------------------------------  IN: 2100 mL / OUT: 470 mL / NET: 1630 mL    PHYSICAL EXAM  General: A&Ox 3; NAD on bipap   Respiratory: CTA B/L; no wheezes  Cardiovascular: Regular rhythm/rate  Gastrointestinal: Soft; NTND  Extremities: WWP; no edema   Neurological:  CNII-XII grossly intact; no obvious focal deficits    IMAGING/EKG/ETC  Reviewed.    INTERVAL COURSE  POD# 0   MIDCAB  Extubated prior to transfer, drowsy and hypoventilating-- ABG hypercapnic and acidotic  Improved with BiPAP- No infusions   Awake, alert and cooperative   Postop labs and Xray reviewed      VITALS  Vital Signs Last 24 Hrs  T(C): 35.9 (14 Apr 2023 20:53), Max: 37.1 (13 Apr 2023 22:28)  T(F): 96.6 (14 Apr 2023 20:53), Max: 98.8 (13 Apr 2023 22:28)  HR: 85 (14 Apr 2023 22:00) (72 - 95)  BP: 119/77 (14 Apr 2023 19:30) (117/76 - 122/72)  BP(mean): 93 (14 Apr 2023 19:30) (91 - 96)  RR: 20 (14 Apr 2023 22:00) (12 - 20)  SpO2: 96% (14 Apr 2023 22:00) (94% - 98%)    Parameters below as of 14 Apr 2023 22:00  Patient On (Oxygen Delivery Method): BiPAP/CPAP    O2 Concentration (%): 80    I&O's Summary  14 Apr 2023 07:01  -  14 Apr 2023 22:23  --------------------------------------------------------  IN: 2100 mL / OUT: 470 mL / NET: 1630 mL    PHYSICAL EXAM  General: A&Ox 3; NAD on bipap   Respiratory: CTA B/L; no wheezes  Cardiovascular: Regular rhythm/rate  Gastrointestinal: Soft; NTND  Extremities: WWP; no edema   Neurological:  CNII-XII grossly intact; no obvious focal deficits    IMAGING/EKG/ETC  Reviewed.

## 2023-04-14 NOTE — PATIENT PROFILE ADULT - FALL HARM RISK - UNIVERSAL INTERVENTIONS
Bed in lowest position, wheels locked, appropriate side rails in place/Call bell, personal items and telephone in reach/Instruct patient to call for assistance before getting out of bed or chair/Non-slip footwear when patient is out of bed/[place] to call system/Physically safe environment - no spills, clutter or unnecessary equipment/Purposeful Proactive Rounding/Room/bathroom lighting operational, light cord in reach

## 2023-04-15 LAB
ALBUMIN SERPL ELPH-MCNC: 3.7 G/DL — SIGNIFICANT CHANGE UP (ref 3.3–5)
ALBUMIN SERPL ELPH-MCNC: 4.3 G/DL — SIGNIFICANT CHANGE UP (ref 3.3–5)
ALP SERPL-CCNC: 48 U/L — SIGNIFICANT CHANGE UP (ref 40–120)
ALP SERPL-CCNC: 53 U/L — SIGNIFICANT CHANGE UP (ref 40–120)
ALT FLD-CCNC: 20 U/L — SIGNIFICANT CHANGE UP (ref 10–45)
ALT FLD-CCNC: 22 U/L — SIGNIFICANT CHANGE UP (ref 10–45)
ANION GAP SERPL CALC-SCNC: 12 MMOL/L — SIGNIFICANT CHANGE UP (ref 5–17)
ANION GAP SERPL CALC-SCNC: 9 MMOL/L — SIGNIFICANT CHANGE UP (ref 5–17)
APTT BLD: 24.2 SEC — LOW (ref 27.5–35.5)
APTT BLD: 24.9 SEC — LOW (ref 27.5–35.5)
AST SERPL-CCNC: 20 U/L — SIGNIFICANT CHANGE UP (ref 10–40)
AST SERPL-CCNC: 20 U/L — SIGNIFICANT CHANGE UP (ref 10–40)
BILIRUB SERPL-MCNC: 0.4 MG/DL — SIGNIFICANT CHANGE UP (ref 0.2–1.2)
BILIRUB SERPL-MCNC: 0.5 MG/DL — SIGNIFICANT CHANGE UP (ref 0.2–1.2)
BUN SERPL-MCNC: 14 MG/DL — SIGNIFICANT CHANGE UP (ref 7–23)
BUN SERPL-MCNC: 18 MG/DL — SIGNIFICANT CHANGE UP (ref 7–23)
CALCIUM SERPL-MCNC: 8.4 MG/DL — SIGNIFICANT CHANGE UP (ref 8.4–10.5)
CALCIUM SERPL-MCNC: 8.6 MG/DL — SIGNIFICANT CHANGE UP (ref 8.4–10.5)
CHLORIDE SERPL-SCNC: 101 MMOL/L — SIGNIFICANT CHANGE UP (ref 96–108)
CHLORIDE SERPL-SCNC: 103 MMOL/L — SIGNIFICANT CHANGE UP (ref 96–108)
CO2 SERPL-SCNC: 22 MMOL/L — SIGNIFICANT CHANGE UP (ref 22–31)
CO2 SERPL-SCNC: 23 MMOL/L — SIGNIFICANT CHANGE UP (ref 22–31)
CREAT SERPL-MCNC: 0.96 MG/DL — SIGNIFICANT CHANGE UP (ref 0.5–1.3)
CREAT SERPL-MCNC: 1.07 MG/DL — SIGNIFICANT CHANGE UP (ref 0.5–1.3)
EGFR: 78 ML/MIN/1.73M2 — SIGNIFICANT CHANGE UP
EGFR: 89 ML/MIN/1.73M2 — SIGNIFICANT CHANGE UP
GAS PNL BLDA: SIGNIFICANT CHANGE UP
GLUCOSE BLDC GLUCOMTR-MCNC: 139 MG/DL — HIGH (ref 70–99)
GLUCOSE BLDC GLUCOMTR-MCNC: 165 MG/DL — HIGH (ref 70–99)
GLUCOSE SERPL-MCNC: 147 MG/DL — HIGH (ref 70–99)
GLUCOSE SERPL-MCNC: 163 MG/DL — HIGH (ref 70–99)
HCT VFR BLD CALC: 36.4 % — LOW (ref 39–50)
HCT VFR BLD CALC: 37.4 % — LOW (ref 39–50)
HGB BLD-MCNC: 12.1 G/DL — LOW (ref 13–17)
HGB BLD-MCNC: 12.4 G/DL — LOW (ref 13–17)
INR BLD: 1.07 — SIGNIFICANT CHANGE UP (ref 0.88–1.16)
INR BLD: 1.08 — SIGNIFICANT CHANGE UP (ref 0.88–1.16)
MAGNESIUM SERPL-MCNC: 2.4 MG/DL — SIGNIFICANT CHANGE UP (ref 1.6–2.6)
MAGNESIUM SERPL-MCNC: 2.4 MG/DL — SIGNIFICANT CHANGE UP (ref 1.6–2.6)
MCHC RBC-ENTMCNC: 30.8 PG — SIGNIFICANT CHANGE UP (ref 27–34)
MCHC RBC-ENTMCNC: 31.1 PG — SIGNIFICANT CHANGE UP (ref 27–34)
MCHC RBC-ENTMCNC: 33.2 GM/DL — SIGNIFICANT CHANGE UP (ref 32–36)
MCHC RBC-ENTMCNC: 33.2 GM/DL — SIGNIFICANT CHANGE UP (ref 32–36)
MCV RBC AUTO: 93 FL — SIGNIFICANT CHANGE UP (ref 80–100)
MCV RBC AUTO: 93.6 FL — SIGNIFICANT CHANGE UP (ref 80–100)
NRBC # BLD: 0 /100 WBCS — SIGNIFICANT CHANGE UP (ref 0–0)
NRBC # BLD: 0 /100 WBCS — SIGNIFICANT CHANGE UP (ref 0–0)
PHOSPHATE SERPL-MCNC: 2.7 MG/DL — SIGNIFICANT CHANGE UP (ref 2.5–4.5)
PHOSPHATE SERPL-MCNC: 3.7 MG/DL — SIGNIFICANT CHANGE UP (ref 2.5–4.5)
PLATELET # BLD AUTO: 277 K/UL — SIGNIFICANT CHANGE UP (ref 150–400)
PLATELET # BLD AUTO: 299 K/UL — SIGNIFICANT CHANGE UP (ref 150–400)
POTASSIUM SERPL-MCNC: 4.7 MMOL/L — SIGNIFICANT CHANGE UP (ref 3.5–5.3)
POTASSIUM SERPL-MCNC: 4.8 MMOL/L — SIGNIFICANT CHANGE UP (ref 3.5–5.3)
POTASSIUM SERPL-SCNC: 4.7 MMOL/L — SIGNIFICANT CHANGE UP (ref 3.5–5.3)
POTASSIUM SERPL-SCNC: 4.8 MMOL/L — SIGNIFICANT CHANGE UP (ref 3.5–5.3)
PROT SERPL-MCNC: 6.5 G/DL — SIGNIFICANT CHANGE UP (ref 6–8.3)
PROT SERPL-MCNC: 7.2 G/DL — SIGNIFICANT CHANGE UP (ref 6–8.3)
PROTHROM AB SERPL-ACNC: 12.8 SEC — SIGNIFICANT CHANGE UP (ref 10.5–13.4)
PROTHROM AB SERPL-ACNC: 12.9 SEC — SIGNIFICANT CHANGE UP (ref 10.5–13.4)
RBC # BLD: 3.89 M/UL — LOW (ref 4.2–5.8)
RBC # BLD: 4.02 M/UL — LOW (ref 4.2–5.8)
RBC # FLD: 12.9 % — SIGNIFICANT CHANGE UP (ref 10.3–14.5)
RBC # FLD: 12.9 % — SIGNIFICANT CHANGE UP (ref 10.3–14.5)
SODIUM SERPL-SCNC: 135 MMOL/L — SIGNIFICANT CHANGE UP (ref 135–145)
SODIUM SERPL-SCNC: 135 MMOL/L — SIGNIFICANT CHANGE UP (ref 135–145)
WBC # BLD: 15.32 K/UL — HIGH (ref 3.8–10.5)
WBC # BLD: 18.84 K/UL — HIGH (ref 3.8–10.5)
WBC # FLD AUTO: 15.32 K/UL — HIGH (ref 3.8–10.5)
WBC # FLD AUTO: 18.84 K/UL — HIGH (ref 3.8–10.5)

## 2023-04-15 PROCEDURE — 71045 X-RAY EXAM CHEST 1 VIEW: CPT | Mod: 26

## 2023-04-15 PROCEDURE — 99232 SBSQ HOSP IP/OBS MODERATE 35: CPT

## 2023-04-15 RX ORDER — METOPROLOL TARTRATE 50 MG
12.5 TABLET ORAL EVERY 12 HOURS
Refills: 0 | Status: DISCONTINUED | OUTPATIENT
Start: 2023-04-15 | End: 2023-04-16

## 2023-04-15 RX ORDER — DEXTROSE 50 % IN WATER 50 %
12.5 SYRINGE (ML) INTRAVENOUS ONCE
Refills: 0 | Status: DISCONTINUED | OUTPATIENT
Start: 2023-04-15 | End: 2023-04-18

## 2023-04-15 RX ORDER — SODIUM CHLORIDE 9 MG/ML
1000 INJECTION, SOLUTION INTRAVENOUS
Refills: 0 | Status: DISCONTINUED | OUTPATIENT
Start: 2023-04-15 | End: 2023-04-18

## 2023-04-15 RX ORDER — DEXTROSE 50 % IN WATER 50 %
25 SYRINGE (ML) INTRAVENOUS ONCE
Refills: 0 | Status: DISCONTINUED | OUTPATIENT
Start: 2023-04-15 | End: 2023-04-18

## 2023-04-15 RX ORDER — METOCLOPRAMIDE HCL 10 MG
10 TABLET ORAL ONCE
Refills: 0 | Status: COMPLETED | OUTPATIENT
Start: 2023-04-15 | End: 2023-04-15

## 2023-04-15 RX ORDER — OXYCODONE HYDROCHLORIDE 5 MG/1
10 TABLET ORAL EVERY 6 HOURS
Refills: 0 | Status: DISCONTINUED | OUTPATIENT
Start: 2023-04-15 | End: 2023-04-18

## 2023-04-15 RX ORDER — GLUCAGON INJECTION, SOLUTION 0.5 MG/.1ML
1 INJECTION, SOLUTION SUBCUTANEOUS ONCE
Refills: 0 | Status: DISCONTINUED | OUTPATIENT
Start: 2023-04-15 | End: 2023-04-18

## 2023-04-15 RX ORDER — ACETAMINOPHEN 500 MG
1000 TABLET ORAL ONCE
Refills: 0 | Status: COMPLETED | OUTPATIENT
Start: 2023-04-15 | End: 2023-04-15

## 2023-04-15 RX ORDER — ACETAMINOPHEN 500 MG
650 TABLET ORAL EVERY 6 HOURS
Refills: 0 | Status: DISCONTINUED | OUTPATIENT
Start: 2023-04-15 | End: 2023-04-17

## 2023-04-15 RX ORDER — INSULIN LISPRO 100/ML
VIAL (ML) SUBCUTANEOUS
Refills: 0 | Status: DISCONTINUED | OUTPATIENT
Start: 2023-04-15 | End: 2023-04-18

## 2023-04-15 RX ORDER — OXYCODONE HYDROCHLORIDE 5 MG/1
5 TABLET ORAL EVERY 6 HOURS
Refills: 0 | Status: DISCONTINUED | OUTPATIENT
Start: 2023-04-15 | End: 2023-04-18

## 2023-04-15 RX ORDER — DEXTROSE 50 % IN WATER 50 %
15 SYRINGE (ML) INTRAVENOUS ONCE
Refills: 0 | Status: DISCONTINUED | OUTPATIENT
Start: 2023-04-15 | End: 2023-04-18

## 2023-04-15 RX ORDER — SODIUM CHLORIDE 9 MG/ML
3 INJECTION INTRAMUSCULAR; INTRAVENOUS; SUBCUTANEOUS EVERY 8 HOURS
Refills: 0 | Status: DISCONTINUED | OUTPATIENT
Start: 2023-04-15 | End: 2023-04-18

## 2023-04-15 RX ADMIN — Medication 100 MILLIGRAM(S): at 05:53

## 2023-04-15 RX ADMIN — OXYCODONE HYDROCHLORIDE 5 MILLIGRAM(S): 5 TABLET ORAL at 11:44

## 2023-04-15 RX ADMIN — HEPARIN SODIUM 5000 UNIT(S): 5000 INJECTION INTRAVENOUS; SUBCUTANEOUS at 21:25

## 2023-04-15 RX ADMIN — Medication 100 MILLIGRAM(S): at 21:25

## 2023-04-15 RX ADMIN — Medication 650 MILLIGRAM(S): at 15:30

## 2023-04-15 RX ADMIN — PANTOPRAZOLE SODIUM 40 MILLIGRAM(S): 20 TABLET, DELAYED RELEASE ORAL at 11:42

## 2023-04-15 RX ADMIN — HEPARIN SODIUM 5000 UNIT(S): 5000 INJECTION INTRAVENOUS; SUBCUTANEOUS at 05:53

## 2023-04-15 RX ADMIN — ATORVASTATIN CALCIUM 80 MILLIGRAM(S): 80 TABLET, FILM COATED ORAL at 21:25

## 2023-04-15 RX ADMIN — Medication 100 MILLIGRAM(S): at 14:59

## 2023-04-15 RX ADMIN — Medication 12.5 MILLIGRAM(S): at 20:51

## 2023-04-15 RX ADMIN — CLOPIDOGREL BISULFATE 75 MILLIGRAM(S): 75 TABLET, FILM COATED ORAL at 11:42

## 2023-04-15 RX ADMIN — Medication 10 MILLIGRAM(S): at 20:52

## 2023-04-15 RX ADMIN — HEPARIN SODIUM 5000 UNIT(S): 5000 INJECTION INTRAVENOUS; SUBCUTANEOUS at 14:59

## 2023-04-15 RX ADMIN — OXYCODONE HYDROCHLORIDE 5 MILLIGRAM(S): 5 TABLET ORAL at 12:44

## 2023-04-15 RX ADMIN — Medication 81 MILLIGRAM(S): at 11:42

## 2023-04-15 RX ADMIN — SODIUM CHLORIDE 3 MILLILITER(S): 9 INJECTION INTRAMUSCULAR; INTRAVENOUS; SUBCUTANEOUS at 21:48

## 2023-04-15 RX ADMIN — Medication 400 MILLIGRAM(S): at 04:55

## 2023-04-15 RX ADMIN — Medication 1000 MILLIGRAM(S): at 05:15

## 2023-04-15 RX ADMIN — Medication 650 MILLIGRAM(S): at 14:40

## 2023-04-15 NOTE — PROGRESS NOTE ADULT - SUBJECTIVE AND OBJECTIVE BOX
CTICU  CRITICAL  CARE  attending     Hand off received 					   Pertinent clinical, laboratory, radiographic, hemodynamic, echocardiographic, respiratory data, microbiologic data and chart were reviewed and analyzed frequently throughout the course of the day and night  Patient seen and examined with CTS/ SH attending at bedside    Pt is a 63y , Male, post op day # 1 s/p CABG x 1; robotic assisted MidCAB    post op:    remains extubated  initial rep acidosis; treated with Bipap; now resolved  stable hemodynamics  acute post thoracotomy pain     63 year male with history of HTN, HLD, SVT, systolic CHF with improved LVEF, CAD s/p PCI LPDA in June 2020 who was sent to the ER at [facility] on 04/13/2023 by his cardiologist for having a positive stress test. Patient explains he has been having intermittent DOCKERY for the last few weeks. He also states he has had some mild chest discomfort which he attributed to dyspepsia as it is sometimes accompanied by a burning in his stomach. During the stress test in office today patient developed limiting pain in his shoulders and back and was found to have profound ST depressions. Cardiac Catheterization completed showing >90% proximal LAD stenosis at a trifurcation with a high-rising D1, and distal left main with moderate disease. Mild LCx. Non-dominant RCA.        , FAMILY HISTORY:  PAST MEDICAL & SURGICAL HISTORY:  Hypertension      Hyperlipidemia      SVT (supraventricular tachycardia)      PVC (premature ventricular contraction)      GERD (gastroesophageal reflux disease)      History of coronary artery stent placement      H/O: upper GI bleed        Patient is a 63y old  Male who presents with a chief complaint of CAD (14 Apr 2023 22:17)      14 system review limited 2/2 post op morbidity    Vital signs, hemodynamic and respiratory parameters were reviewed from the bedside nursing flowsheet.  ICU Vital Signs Last 24 Hrs  T(C): 36.8 (15 Apr 2023 13:50), Max: 37 (15 Apr 2023 09:50)  T(F): 98.3 (15 Apr 2023 13:50), Max: 98.6 (15 Apr 2023 09:50)  HR: 84 (15 Apr 2023 16:00) (72 - 91)  BP: 119/77 (14 Apr 2023 19:30) (119/77 - 119/77)  BP(mean): 93 (14 Apr 2023 19:30) (93 - 93)  ABP: 128/75 (15 Apr 2023 16:00) (87/53 - 143/82)  ABP(mean): 95 (15 Apr 2023 16:00) (65 - 106)  RR: 16 (15 Apr 2023 16:00) (12 - 20)  SpO2: 95% (15 Apr 2023 16:00) (93% - 98%)    O2 Parameters below as of 15 Apr 2023 16:00  Patient On (Oxygen Delivery Method): nasal cannula w/ humidification  O2 Flow (L/min): 3        Adult Advanced Hemodynamics Last 24 Hrs  CVP(mm Hg): 11 (15 Apr 2023 14:00) (7 - 218)  CVP(cm H2O): --  CO: --  CI: --  PA: --  PA(mean): --  PCWP: --  SVR: --  SVRI: --  PVR: --  PVRI: --, ABG - ( 15 Apr 2023 15:19 )  pH, Arterial: 7.43  pH, Blood: x     /  pCO2: 36    /  pO2: 79    / HCO3: 24    / Base Excess: -0.1  /  SaO2: 97.0                Intake and output was reviewed and the fluid balance was calculated  Daily     Daily   I&O's Summary    14 Apr 2023 07:01  -  15 Apr 2023 07:00  --------------------------------------------------------  IN: 2390 mL / OUT: 1230 mL / NET: 1160 mL    15 Apr 2023 07:01  -  15 Apr 2023 16:19  --------------------------------------------------------  IN: 30 mL / OUT: 915 mL / NET: -885 mL        All lines and drain sites were assessed  Glycemic trend was reviewedCAPILLARY BLOOD GLUCOSE      POCT Blood Glucose.: 165 mg/dL (15 Apr 2023 01:06)    No acute change in mental status  Auscultation of the chest reveals equal bs  Abdomen is soft  Extremities are warm and well perfused  Wounds appear clean and unremarkable  Antibiotics are periop    labs  CBC Full  -  ( 15 Apr 2023 15:07 )  WBC Count : 18.84 K/uL  RBC Count : 4.02 M/uL  Hemoglobin : 12.4 g/dL  Hematocrit : 37.4 %  Platelet Count - Automated : 299 K/uL  Mean Cell Volume : 93.0 fl  Mean Cell Hemoglobin : 30.8 pg  Mean Cell Hemoglobin Concentration : 33.2 gm/dL  Auto Neutrophil # : x  Auto Lymphocyte # : x  Auto Monocyte # : x  Auto Eosinophil # : x  Auto Basophil # : x  Auto Neutrophil % : x  Auto Lymphocyte % : x  Auto Monocyte % : x  Auto Eosinophil % : x  Auto Basophil % : x    04-15    135  |  101  |  18  ----------------------------<  147<H>  4.8   |  22  |  1.07    Ca    8.6      15 Apr 2023 15:07  Phos  2.7     04-15  Mg     2.4     04-15    TPro  7.2  /  Alb  4.3  /  TBili  0.5  /  DBili  x   /  AST  20  /  ALT  20  /  AlkPhos  53  04-15    PT/INR - ( 15 Apr 2023 15:07 )   PT: 12.9 sec;   INR: 1.08          PTT - ( 15 Apr 2023 15:07 )  PTT:24.9 sec  The current medications were reviewed   MEDICATIONS  (STANDING):  aspirin  chewable 81 milliGRAM(s) Oral daily  atorvastatin 80 milliGRAM(s) Oral at bedtime  ceFAZolin   IVPB 2000 milliGRAM(s) IV Intermittent every 8 hours  chlorhexidine 0.12% Liquid 5 milliLiter(s) Oral Mucosa two times a day  chlorhexidine 4% Liquid 1 Application(s) Topical once  clopidogrel Tablet 75 milliGRAM(s) Oral daily  dextrose 50% Injectable 50 milliLiter(s) IV Push every 15 minutes  dextrose 50% Injectable 25 milliLiter(s) IV Push every 15 minutes  heparin   Injectable 5000 Unit(s) SubCutaneous every 8 hours  insulin regular Infusion 1 Unit(s)/Hr (1 mL/Hr) IV Continuous <Continuous>  pantoprazole    Tablet 40 milliGRAM(s) Oral daily  polyethylene glycol 3350 17 Gram(s) Oral daily  senna 2 Tablet(s) Oral at bedtime  sodium chloride 0.9%. 1000 milliLiter(s) (10 mL/Hr) IV Continuous <Continuous>    MEDICATIONS  (PRN):  acetaminophen     Tablet .. 650 milliGRAM(s) Oral every 6 hours PRN Mild Pain (1 - 3)  oxyCODONE    IR 5 milliGRAM(s) Oral every 6 hours PRN Moderate Pain (4 - 6)  oxyCODONE    IR 10 milliGRAM(s) Oral every 6 hours PRN Severe Pain (7 - 10)       PROBLEM LIST/ ASSESSMENT:  HEALTH ISSUES - PROBLEM Dx:      ,   Patient is a 63y old  Male who presents with a chief complaint of CAD (14 Apr 2023 22:17)     s/p CABG x 1V        My plan includes :    Maintain MAP >65-70  supplemental O2 to maintain Sao2 >96%  Strict blood sugar control  Analgesia for acute post thoracotomy pain    close hemodynamic, ventilatory and drain monitoring and management per post op routine    Monitor for arrhythmias and monitor parameters for organ perfusion  monitor neurologic status  Head of the bed should remain elevated to 45 deg .   chest PT and IS will be encouraged  monitor adequacy of oxygenation and ventilation and attempt to wean oxygen  Nutritional goals will be met using po eventually , ensure adequate caloric intake and montior the same  Stress ulcer and VTE prophylaxis will be achieved    Glycemic control is satisfactory  Electrolytes have been repleted as necessary and wound care has been carried out. Pain control has been achieved.   agressive physical therapy and early mobility and ambulation goals will be met   The family was updated about the course and plan  CRITICAL CARE TIME SPENT in evaluation and management, reassessments, review and interpretation of labs and x-rays, ventilator and hemodynamic management, formulating a plan and coordinating care: __30___ MIN.  Time does not include procedural time.  CTICU ATTENDING     					    Adrian Freire MD

## 2023-04-15 NOTE — PHYSICAL THERAPY INITIAL EVALUATION ADULT - GENERAL OBSERVATIONS, REHAB EVAL
Pt received OOB in chair +hep lock +tele +A line +2LNC +bonilla +blakes. PT received consent to treat from PRISCILLA Maravilla pt tolerated session well amb with hand held assist supervision no LOB noted. Pt was left as received with call bell in reach, VSS, and in NAD.

## 2023-04-15 NOTE — PHYSICAL THERAPY INITIAL EVALUATION ADULT - DID THE PATIENT HAVE SURGERY?
Minimally invasive direct coronary artery bypass (MIDCAB) with transesophageal echocardiography (DOUG)/yes

## 2023-04-15 NOTE — PHYSICAL THERAPY INITIAL EVALUATION ADULT - ADDITIONAL COMMENTS
Pt states he lives alone on ground floor with 3 MICHAEL. pt was independent with ADLs and amb PTA denies use of AD.

## 2023-04-15 NOTE — PHYSICAL THERAPY INITIAL EVALUATION ADULT - PERTINENT HX OF CURRENT PROBLEM, REHAB EVAL
Patient is a 63 year male with history of HTN, HLD, SVT, systolic CHF with improved LVEF, CAD s/p PCI LPDA in June 2020 who was sent to the ER at [facility] on 04/13/2023 by his cardiologist for having a positive stress test. Patient explains he has been having intermittent DOCKERY for the last few weeks. He also states he has had some mild chest discomfort which he attributed to dyspepsia as it is sometimes accompanied by a burning in his stomach. During the stress test in office today patient developed limiting pain in his shoulders and back and was found to have profound ST depressions. Cardiac Catheterization completed showing >90% proximal LAD stenosis at a trifurcation with a high-rising D1, and distal left main with moderate disease. Mild LCx. Non-dominant RCA. Arterial hemostasis with 6FR Perclose. Patient now transferred to [facility] for further work up and MGMT under the care of Dr. Hernandez. Patient currently without complaints.

## 2023-04-16 LAB
ALBUMIN SERPL ELPH-MCNC: 3.7 G/DL — SIGNIFICANT CHANGE UP (ref 3.3–5)
ALBUMIN SERPL ELPH-MCNC: 3.7 G/DL — SIGNIFICANT CHANGE UP (ref 3.3–5)
ALP SERPL-CCNC: 52 U/L — SIGNIFICANT CHANGE UP (ref 40–120)
ALP SERPL-CCNC: 57 U/L — SIGNIFICANT CHANGE UP (ref 40–120)
ALT FLD-CCNC: 13 U/L — SIGNIFICANT CHANGE UP (ref 10–45)
ALT FLD-CCNC: 14 U/L — SIGNIFICANT CHANGE UP (ref 10–45)
ANION GAP SERPL CALC-SCNC: 13 MMOL/L — SIGNIFICANT CHANGE UP (ref 5–17)
ANION GAP SERPL CALC-SCNC: 8 MMOL/L — SIGNIFICANT CHANGE UP (ref 5–17)
APTT BLD: 27.9 SEC — SIGNIFICANT CHANGE UP (ref 27.5–35.5)
AST SERPL-CCNC: 16 U/L — SIGNIFICANT CHANGE UP (ref 10–40)
AST SERPL-CCNC: 17 U/L — SIGNIFICANT CHANGE UP (ref 10–40)
BILIRUB SERPL-MCNC: 0.5 MG/DL — SIGNIFICANT CHANGE UP (ref 0.2–1.2)
BILIRUB SERPL-MCNC: 0.6 MG/DL — SIGNIFICANT CHANGE UP (ref 0.2–1.2)
BUN SERPL-MCNC: 18 MG/DL — SIGNIFICANT CHANGE UP (ref 7–23)
BUN SERPL-MCNC: 18 MG/DL — SIGNIFICANT CHANGE UP (ref 7–23)
CALCIUM SERPL-MCNC: 8.6 MG/DL — SIGNIFICANT CHANGE UP (ref 8.4–10.5)
CALCIUM SERPL-MCNC: 8.9 MG/DL — SIGNIFICANT CHANGE UP (ref 8.4–10.5)
CHLORIDE SERPL-SCNC: 91 MMOL/L — LOW (ref 96–108)
CHLORIDE SERPL-SCNC: 99 MMOL/L — SIGNIFICANT CHANGE UP (ref 96–108)
CO2 SERPL-SCNC: 25 MMOL/L — SIGNIFICANT CHANGE UP (ref 22–31)
CO2 SERPL-SCNC: 26 MMOL/L — SIGNIFICANT CHANGE UP (ref 22–31)
CREAT SERPL-MCNC: 1.06 MG/DL — SIGNIFICANT CHANGE UP (ref 0.5–1.3)
CREAT SERPL-MCNC: 1.17 MG/DL — SIGNIFICANT CHANGE UP (ref 0.5–1.3)
EGFR: 70 ML/MIN/1.73M2 — SIGNIFICANT CHANGE UP
EGFR: 79 ML/MIN/1.73M2 — SIGNIFICANT CHANGE UP
GLUCOSE BLDC GLUCOMTR-MCNC: 127 MG/DL — HIGH (ref 70–99)
GLUCOSE BLDC GLUCOMTR-MCNC: 133 MG/DL — HIGH (ref 70–99)
GLUCOSE BLDC GLUCOMTR-MCNC: 135 MG/DL — HIGH (ref 70–99)
GLUCOSE BLDC GLUCOMTR-MCNC: 173 MG/DL — HIGH (ref 70–99)
GLUCOSE SERPL-MCNC: 142 MG/DL — HIGH (ref 70–99)
GLUCOSE SERPL-MCNC: 193 MG/DL — HIGH (ref 70–99)
HCT VFR BLD CALC: 35.2 % — LOW (ref 39–50)
HCT VFR BLD CALC: 35.2 % — LOW (ref 39–50)
HGB BLD-MCNC: 11.8 G/DL — LOW (ref 13–17)
HGB BLD-MCNC: 11.9 G/DL — LOW (ref 13–17)
INR BLD: 1.17 — HIGH (ref 0.88–1.16)
MAGNESIUM SERPL-MCNC: 2.2 MG/DL — SIGNIFICANT CHANGE UP (ref 1.6–2.6)
MAGNESIUM SERPL-MCNC: 2.2 MG/DL — SIGNIFICANT CHANGE UP (ref 1.6–2.6)
MCHC RBC-ENTMCNC: 31.2 PG — SIGNIFICANT CHANGE UP (ref 27–34)
MCHC RBC-ENTMCNC: 31.5 PG — SIGNIFICANT CHANGE UP (ref 27–34)
MCHC RBC-ENTMCNC: 33.5 GM/DL — SIGNIFICANT CHANGE UP (ref 32–36)
MCHC RBC-ENTMCNC: 33.8 GM/DL — SIGNIFICANT CHANGE UP (ref 32–36)
MCV RBC AUTO: 93.1 FL — SIGNIFICANT CHANGE UP (ref 80–100)
MCV RBC AUTO: 93.1 FL — SIGNIFICANT CHANGE UP (ref 80–100)
NRBC # BLD: 0 /100 WBCS — SIGNIFICANT CHANGE UP (ref 0–0)
NRBC # BLD: 0 /100 WBCS — SIGNIFICANT CHANGE UP (ref 0–0)
PHOSPHATE SERPL-MCNC: 2 MG/DL — LOW (ref 2.5–4.5)
PLATELET # BLD AUTO: 258 K/UL — SIGNIFICANT CHANGE UP (ref 150–400)
PLATELET # BLD AUTO: 277 K/UL — SIGNIFICANT CHANGE UP (ref 150–400)
POTASSIUM SERPL-MCNC: 3.7 MMOL/L — SIGNIFICANT CHANGE UP (ref 3.5–5.3)
POTASSIUM SERPL-MCNC: 4.4 MMOL/L — SIGNIFICANT CHANGE UP (ref 3.5–5.3)
POTASSIUM SERPL-SCNC: 3.7 MMOL/L — SIGNIFICANT CHANGE UP (ref 3.5–5.3)
POTASSIUM SERPL-SCNC: 4.4 MMOL/L — SIGNIFICANT CHANGE UP (ref 3.5–5.3)
PROT SERPL-MCNC: 6.7 G/DL — SIGNIFICANT CHANGE UP (ref 6–8.3)
PROT SERPL-MCNC: 7.2 G/DL — SIGNIFICANT CHANGE UP (ref 6–8.3)
PROTHROM AB SERPL-ACNC: 13.9 SEC — HIGH (ref 10.5–13.4)
RBC # BLD: 3.78 M/UL — LOW (ref 4.2–5.8)
RBC # BLD: 3.78 M/UL — LOW (ref 4.2–5.8)
RBC # FLD: 13.1 % — SIGNIFICANT CHANGE UP (ref 10.3–14.5)
RBC # FLD: 13.1 % — SIGNIFICANT CHANGE UP (ref 10.3–14.5)
SODIUM SERPL-SCNC: 129 MMOL/L — LOW (ref 135–145)
SODIUM SERPL-SCNC: 133 MMOL/L — LOW (ref 135–145)
WBC # BLD: 14.45 K/UL — HIGH (ref 3.8–10.5)
WBC # BLD: 16.08 K/UL — HIGH (ref 3.8–10.5)
WBC # FLD AUTO: 14.45 K/UL — HIGH (ref 3.8–10.5)
WBC # FLD AUTO: 16.08 K/UL — HIGH (ref 3.8–10.5)

## 2023-04-16 PROCEDURE — 99232 SBSQ HOSP IP/OBS MODERATE 35: CPT

## 2023-04-16 PROCEDURE — 71045 X-RAY EXAM CHEST 1 VIEW: CPT | Mod: 26

## 2023-04-16 RX ORDER — METOPROLOL TARTRATE 50 MG
25 TABLET ORAL EVERY 12 HOURS
Refills: 0 | Status: DISCONTINUED | OUTPATIENT
Start: 2023-04-16 | End: 2023-04-17

## 2023-04-16 RX ORDER — FUROSEMIDE 40 MG
20 TABLET ORAL DAILY
Refills: 0 | Status: ACTIVE | OUTPATIENT
Start: 2023-04-17 | End: 2024-03-15

## 2023-04-16 RX ORDER — POTASSIUM CHLORIDE 20 MEQ
40 PACKET (EA) ORAL ONCE
Refills: 0 | Status: COMPLETED | OUTPATIENT
Start: 2023-04-16 | End: 2023-04-16

## 2023-04-16 RX ORDER — AMIODARONE HYDROCHLORIDE 400 MG/1
150 TABLET ORAL ONCE
Refills: 0 | Status: COMPLETED | OUTPATIENT
Start: 2023-04-16 | End: 2023-04-16

## 2023-04-16 RX ORDER — FUROSEMIDE 40 MG
40 TABLET ORAL ONCE
Refills: 0 | Status: COMPLETED | OUTPATIENT
Start: 2023-04-16 | End: 2023-04-16

## 2023-04-16 RX ORDER — POTASSIUM CHLORIDE 20 MEQ
10 PACKET (EA) ORAL DAILY
Refills: 0 | Status: DISCONTINUED | OUTPATIENT
Start: 2023-04-17 | End: 2023-04-18

## 2023-04-16 RX ORDER — AMIODARONE HYDROCHLORIDE 400 MG/1
150 TABLET ORAL ONCE
Refills: 0 | Status: COMPLETED | OUTPATIENT
Start: 2023-04-16 | End: 2023-04-17

## 2023-04-16 RX ADMIN — Medication 100 MILLIGRAM(S): at 05:50

## 2023-04-16 RX ADMIN — PANTOPRAZOLE SODIUM 40 MILLIGRAM(S): 20 TABLET, DELAYED RELEASE ORAL at 11:29

## 2023-04-16 RX ADMIN — Medication 650 MILLIGRAM(S): at 23:10

## 2023-04-16 RX ADMIN — Medication 25 MILLIGRAM(S): at 18:10

## 2023-04-16 RX ADMIN — Medication 81 MILLIGRAM(S): at 11:29

## 2023-04-16 RX ADMIN — OXYCODONE HYDROCHLORIDE 5 MILLIGRAM(S): 5 TABLET ORAL at 21:05

## 2023-04-16 RX ADMIN — POLYETHYLENE GLYCOL 3350 17 GRAM(S): 17 POWDER, FOR SOLUTION ORAL at 11:28

## 2023-04-16 RX ADMIN — AMIODARONE HYDROCHLORIDE 133.33 MILLIGRAM(S): 400 TABLET ORAL at 22:45

## 2023-04-16 RX ADMIN — OXYCODONE HYDROCHLORIDE 10 MILLIGRAM(S): 5 TABLET ORAL at 10:00

## 2023-04-16 RX ADMIN — HEPARIN SODIUM 5000 UNIT(S): 5000 INJECTION INTRAVENOUS; SUBCUTANEOUS at 05:50

## 2023-04-16 RX ADMIN — Medication 40 MILLIEQUIVALENT(S): at 23:44

## 2023-04-16 RX ADMIN — HEPARIN SODIUM 5000 UNIT(S): 5000 INJECTION INTRAVENOUS; SUBCUTANEOUS at 14:20

## 2023-04-16 RX ADMIN — CLOPIDOGREL BISULFATE 75 MILLIGRAM(S): 75 TABLET, FILM COATED ORAL at 11:29

## 2023-04-16 RX ADMIN — ATORVASTATIN CALCIUM 80 MILLIGRAM(S): 80 TABLET, FILM COATED ORAL at 21:26

## 2023-04-16 RX ADMIN — OXYCODONE HYDROCHLORIDE 5 MILLIGRAM(S): 5 TABLET ORAL at 21:35

## 2023-04-16 RX ADMIN — OXYCODONE HYDROCHLORIDE 10 MILLIGRAM(S): 5 TABLET ORAL at 09:04

## 2023-04-16 RX ADMIN — SODIUM CHLORIDE 3 MILLILITER(S): 9 INJECTION INTRAMUSCULAR; INTRAVENOUS; SUBCUTANEOUS at 06:21

## 2023-04-16 RX ADMIN — HEPARIN SODIUM 5000 UNIT(S): 5000 INJECTION INTRAVENOUS; SUBCUTANEOUS at 21:25

## 2023-04-16 RX ADMIN — AMIODARONE HYDROCHLORIDE 133.33 MILLIGRAM(S): 400 TABLET ORAL at 21:15

## 2023-04-16 RX ADMIN — Medication 12.5 MILLIGRAM(S): at 05:50

## 2023-04-16 RX ADMIN — Medication 40 MILLIGRAM(S): at 14:19

## 2023-04-16 RX ADMIN — Medication 650 MILLIGRAM(S): at 22:45

## 2023-04-16 RX ADMIN — SODIUM CHLORIDE 3 MILLILITER(S): 9 INJECTION INTRAMUSCULAR; INTRAVENOUS; SUBCUTANEOUS at 21:00

## 2023-04-16 RX ADMIN — Medication 2: at 07:40

## 2023-04-16 RX ADMIN — SODIUM CHLORIDE 3 MILLILITER(S): 9 INJECTION INTRAMUSCULAR; INTRAVENOUS; SUBCUTANEOUS at 13:02

## 2023-04-16 NOTE — PROGRESS NOTE ADULT - ASSESSMENT
63 year male with history of HTN, HLD, SVT, systolic CHF with improved LVEF, CAD s/p PCI LPDA in June 2020 who was sent to the ER at [facility] on 04/13/2023 by his cardiologist for having a positive stress test. Patient explains he has been having intermittent DOCKERY for the last few weeks and some mild chest discomfort which he attributed to dyspepsia as it is sometimes accompanied by a burning in his stomach. During the stress test  patient developed limiting pain in his shoulders and back and was found to have profound ST depressions. Cardiac Catheterization completed showing >90% proximal LAD stenosis at a trifurcation with a high-rising D1, and distal left main with moderate disease. Mild LCx. Non-dominant RCA. On 4/14/23 he underwent a MIDCAB with Dr. Hernandez. POd0 he arrived to CTICU extubated but hypoventilating causing acidosis; placed on bipap and given narcan. He was weaned to HFNC and then NC. POD1 he ambulated and his CT was removed. Patient was transferred to [facility] in stable condition. A bedside ultrasound revealed a trace to small left pleural effusion; will cotninue to wean oxygen.    Plan:   Neurovascular:   No delirium. Pain well controlled with current regimen.  acetaminophen     Tablet 650 milliGRAM(s) every 6 hours PRN  oxyCODONE    IR 5 milliGRAM(s) every 6 hours PRN  oxyCODONE    IR 10 milliGRAM(s) every 6 hours PRN      Cardiovascular:   Hemodynamically stable. HR controlled    CAD s/p MIDCAB 4/14  -continue aspirin and Plavix for acute graft patency,  -continue statin for long term graft patency  BP  -Hx HTN, HLD  -c/w metoprolol, asa, statin  HR: 96 (84 - 96)  BP: 115/75 (107/67 - 140/87)  -continue metoprolol tartrate 25 milliGRAM(s) every 12 hours, for BP control  -titrate up as apporpriate  History of CHF      Respiratory:   02 Sat = 98% on RA.  RR: 16 (16 - 18)  SpO2: 94% (90% - 98%)  -Wean to RA from for O2 Sat > 93%.  -Encourage Cough, deep breathing and Use of IS 10x / hr while awake.  -Chest PT 4xdaily    GI:   Stable  Continue pantoprazole    Tablet 40 milliGRAM(s) daily  polyethylene glycol 3350 17 Gram(s) daily  senna 2 Tablet(s) at bedtime  -PO DASH diet    Renal / :   BUN/Cr Stable 18/1.06  -Continue to monitor I/O's.    Endocrine:    Blood sugar stable   A1C: 5.8  TSH: 1.470    Hematologic:  H/H stable 11.8/35.2    -DVT prophylaxis with Heparin sq    ID:  -Afebrile  -Continue to observe for SIRS/Sepsis Syndrome.  T(C): 36.2, Max: 36.9 (04-15-23 @ 21:59)    Disposition:  Home next couple days

## 2023-04-16 NOTE — PROGRESS NOTE ADULT - SUBJECTIVE AND OBJECTIVE BOX
Patient discussed on morning rounds with Dr. Villaseñor     Operation / Date: 4/14/23: MIDCAB (LIMA-LAD), EF normal    SUBJECTIVE ASSESSMENT:  63y Male, seen and examined at bedside; PETRAO. Patient endorses congestion. He denies cp, sob, palpitations, n/v/d/c.    Vital Signs Last 24 Hrs  T(C): 36.2 (16 Apr 2023 14:18), Max: 36.9 (15 Apr 2023 21:59)  T(F): 97.1 (16 Apr 2023 14:18), Max: 98.4 (15 Apr 2023 21:59)  HR: 96 (16 Apr 2023 13:00) (84 - 96)  BP: 115/75 (16 Apr 2023 13:00) (107/67 - 140/87)  BP(mean): 91 (16 Apr 2023 13:00) (82 - 107)  RR: 16 (16 Apr 2023 13:00) (16 - 18)  SpO2: 94% (16 Apr 2023 13:00) (90% - 98%)    Parameters below as of 16 Apr 2023 13:00  Patient On (Oxygen Delivery Method): nasal cannula w/ humidification  O2 Flow (L/min): 4    I&O's Detail    15 Apr 2023 07:01  -  16 Apr 2023 07:00  --------------------------------------------------------  IN:    IV PiggyBack: 100 mL    Oral Fluid: 240 mL    sodium chloride 0.9%: 30 mL  Total IN: 370 mL    OUT:    Chest Tube (mL): 20 mL    Drain (mL): 180 mL    Voided (mL): 1835 mL  Total OUT: 2035 mL    Total NET: -1665 mL      16 Apr 2023 07:01  -  16 Apr 2023 16:19  --------------------------------------------------------  IN:    Oral Fluid: 150 mL  Total IN: 150 mL    OUT:    Drain (mL): 10 mL  Total OUT: 10 mL    Total NET: 140 mL    CHEST TUBE:  No.    ELAN DRAIN:  YES.  EPICARDIAL WIRES:  No.  TIE DOWNS: Yes.  DANIELSON: No.    PHYSICAL EXAM:  GEN: NAD, looks comfortable  Psych: Mood appropriate  Neuro: A&Ox3.  No focal deficits.  Moving all extremities.   HEENT: No obvious abnormalities  CV: S1S2, regular, no murmurs appreciated.  No carotid bruits.  No JVD  Lungs: Crackles/diminished BS b/l lung bases.  No wheezing, rales or rhonchi  ABD: Soft, non-tender, non-distended.  +Bowel sounds  EXT: Warm and well perfused.  No peripheral edema noted  Musculoskeletal: Moving all extremities with normal ROM, no joint swelling  PV: Pedal pulses palpable  Incisions: c/d/i    LABS:                        11.8   16.08 )-----------( 258      ( 16 Apr 2023 10:00 )             35.2     COUMADIN: No.     PT/INR - ( 15 Apr 2023 15:07 )   PT: 12.9 sec;   INR: 1.08          PTT - ( 15 Apr 2023 15:07 )  PTT:24.9 sec    04-16    133<L>  |  99  |  18  ----------------------------<  142<H>  4.4   |  26  |  1.06    Ca    8.6      16 Apr 2023 10:00  Phos  2.7     04-15  Mg     2.2     04-16    TPro  6.7  /  Alb  3.7  /  TBili  0.5  /  DBili  x   /  AST  16  /  ALT  14  /  AlkPhos  52  04-16    MEDICATIONS  (STANDING):  aspirin  chewable 81 milliGRAM(s) Oral daily  atorvastatin 80 milliGRAM(s) Oral at bedtime  clopidogrel Tablet 75 milliGRAM(s) Oral daily  dextrose 5%. 1000 milliLiter(s) (100 mL/Hr) IV Continuous <Continuous>  dextrose 5%. 1000 milliLiter(s) (50 mL/Hr) IV Continuous <Continuous>  dextrose 50% Injectable 25 Gram(s) IV Push once  dextrose 50% Injectable 12.5 Gram(s) IV Push once  dextrose 50% Injectable 25 Gram(s) IV Push once  glucagon  Injectable 1 milliGRAM(s) IntraMuscular once  heparin   Injectable 5000 Unit(s) SubCutaneous every 8 hours  insulin lispro (ADMELOG) corrective regimen sliding scale   SubCutaneous Before meals and at bedtime  metoprolol tartrate 25 milliGRAM(s) Oral every 12 hours  pantoprazole    Tablet 40 milliGRAM(s) Oral daily  polyethylene glycol 3350 17 Gram(s) Oral daily  senna 2 Tablet(s) Oral at bedtime  sodium chloride 0.9% lock flush 3 milliLiter(s) IV Push every 8 hours  sodium chloride 0.9%. 1000 milliLiter(s) (10 mL/Hr) IV Continuous <Continuous>    MEDICATIONS  (PRN):  acetaminophen     Tablet .. 650 milliGRAM(s) Oral every 6 hours PRN Mild Pain (1 - 3)  dextrose Oral Gel 15 Gram(s) Oral once PRN Blood Glucose LESS THAN 70 milliGRAM(s)/deciliter  oxyCODONE    IR 10 milliGRAM(s) Oral every 6 hours PRN Severe Pain (7 - 10)  oxyCODONE    IR 5 milliGRAM(s) Oral every 6 hours PRN Moderate Pain (4 - 6)    RADIOLOGY & ADDITIONAL TESTS:  < from: Xray Chest 1 View- PORTABLE-Routine (Xray Chest 1 View- PORTABLE-Routine in AM.) (04.16.23 @ 05:53) >  INTERPRETATION:  Clinical History: Postop    Frontal examination of the chest demonstrates mild cardiomegaly. No   interval change position remaining support devices in comparison to prior   examination of the chest 4/15/2023. Elevation left hemidiaphragm. Discoid   change left lung base. Small left effusion.    IMPRESSION: Discoid change left lung base. Small left effusion    < end of copied text >

## 2023-04-16 NOTE — CHART NOTE - NSCHARTNOTEFT_GEN_A_CORE
Bedside ultrasound performed   Patient with trace to small left effusion; no right effusion, only atelectasis  No intervention warranted at this time.

## 2023-04-17 LAB
ALBUMIN SERPL ELPH-MCNC: 3.7 G/DL — SIGNIFICANT CHANGE UP (ref 3.3–5)
ALP SERPL-CCNC: 65 U/L — SIGNIFICANT CHANGE UP (ref 40–120)
ALT FLD-CCNC: 11 U/L — SIGNIFICANT CHANGE UP (ref 10–45)
ANION GAP SERPL CALC-SCNC: 12 MMOL/L — SIGNIFICANT CHANGE UP (ref 5–17)
AST SERPL-CCNC: 16 U/L — SIGNIFICANT CHANGE UP (ref 10–40)
BILIRUB SERPL-MCNC: 0.6 MG/DL — SIGNIFICANT CHANGE UP (ref 0.2–1.2)
BUN SERPL-MCNC: 17 MG/DL — SIGNIFICANT CHANGE UP (ref 7–23)
CALCIUM SERPL-MCNC: 8.6 MG/DL — SIGNIFICANT CHANGE UP (ref 8.4–10.5)
CHLORIDE SERPL-SCNC: 94 MMOL/L — LOW (ref 96–108)
CO2 SERPL-SCNC: 27 MMOL/L — SIGNIFICANT CHANGE UP (ref 22–31)
CREAT SERPL-MCNC: 1.08 MG/DL — SIGNIFICANT CHANGE UP (ref 0.5–1.3)
EGFR: 78 ML/MIN/1.73M2 — SIGNIFICANT CHANGE UP
GLUCOSE BLDC GLUCOMTR-MCNC: 124 MG/DL — HIGH (ref 70–99)
GLUCOSE BLDC GLUCOMTR-MCNC: 133 MG/DL — HIGH (ref 70–99)
GLUCOSE BLDC GLUCOMTR-MCNC: 154 MG/DL — HIGH (ref 70–99)
GLUCOSE BLDC GLUCOMTR-MCNC: 158 MG/DL — HIGH (ref 70–99)
GLUCOSE SERPL-MCNC: 145 MG/DL — HIGH (ref 70–99)
HCT VFR BLD CALC: 37.2 % — LOW (ref 39–50)
HGB BLD-MCNC: 12.3 G/DL — LOW (ref 13–17)
INR BLD: 1.11 — SIGNIFICANT CHANGE UP (ref 0.88–1.16)
MAGNESIUM SERPL-MCNC: 2.4 MG/DL — SIGNIFICANT CHANGE UP (ref 1.6–2.6)
MCHC RBC-ENTMCNC: 31.5 PG — SIGNIFICANT CHANGE UP (ref 27–34)
MCHC RBC-ENTMCNC: 33.1 GM/DL — SIGNIFICANT CHANGE UP (ref 32–36)
MCV RBC AUTO: 95.1 FL — SIGNIFICANT CHANGE UP (ref 80–100)
MELD SCORE WITH DIALYSIS: 24 POINTS — SIGNIFICANT CHANGE UP
MELD SCORE WITHOUT DIALYSIS: 8 POINTS — SIGNIFICANT CHANGE UP
NRBC # BLD: 0 /100 WBCS — SIGNIFICANT CHANGE UP (ref 0–0)
PLATELET # BLD AUTO: 261 K/UL — SIGNIFICANT CHANGE UP (ref 150–400)
POTASSIUM SERPL-MCNC: 4.1 MMOL/L — SIGNIFICANT CHANGE UP (ref 3.5–5.3)
POTASSIUM SERPL-SCNC: 4.1 MMOL/L — SIGNIFICANT CHANGE UP (ref 3.5–5.3)
PROT SERPL-MCNC: 7 G/DL — SIGNIFICANT CHANGE UP (ref 6–8.3)
PROTHROM AB SERPL-ACNC: 13.2 SEC — SIGNIFICANT CHANGE UP (ref 10.5–13.4)
RBC # BLD: 3.91 M/UL — LOW (ref 4.2–5.8)
RBC # FLD: 13.2 % — SIGNIFICANT CHANGE UP (ref 10.3–14.5)
SODIUM SERPL-SCNC: 133 MMOL/L — LOW (ref 135–145)
WBC # BLD: 15.65 K/UL — HIGH (ref 3.8–10.5)
WBC # FLD AUTO: 15.65 K/UL — HIGH (ref 3.8–10.5)

## 2023-04-17 PROCEDURE — 76604 US EXAM CHEST: CPT | Mod: 26

## 2023-04-17 PROCEDURE — 71045 X-RAY EXAM CHEST 1 VIEW: CPT | Mod: 26

## 2023-04-17 PROCEDURE — 71045 X-RAY EXAM CHEST 1 VIEW: CPT | Mod: 26,77

## 2023-04-17 RX ORDER — AMIODARONE HYDROCHLORIDE 400 MG/1
400 TABLET ORAL EVERY 8 HOURS
Refills: 0 | Status: DISCONTINUED | OUTPATIENT
Start: 2023-04-17 | End: 2023-04-18

## 2023-04-17 RX ORDER — DILTIAZEM HCL 120 MG
10 CAPSULE, EXT RELEASE 24 HR ORAL ONCE
Refills: 0 | Status: COMPLETED | OUTPATIENT
Start: 2023-04-17 | End: 2023-04-17

## 2023-04-17 RX ORDER — AMIODARONE HYDROCHLORIDE 400 MG/1
TABLET ORAL
Refills: 0 | Status: DISCONTINUED | OUTPATIENT
Start: 2023-04-17 | End: 2023-04-18

## 2023-04-17 RX ORDER — METOPROLOL TARTRATE 50 MG
25 TABLET ORAL EVERY 6 HOURS
Refills: 0 | Status: ACTIVE | OUTPATIENT
Start: 2023-04-17 | End: 2024-03-15

## 2023-04-17 RX ORDER — POTASSIUM PHOSPHATE, MONOBASIC POTASSIUM PHOSPHATE, DIBASIC 236; 224 MG/ML; MG/ML
15 INJECTION, SOLUTION INTRAVENOUS ONCE
Refills: 0 | Status: COMPLETED | OUTPATIENT
Start: 2023-04-17 | End: 2023-04-17

## 2023-04-17 RX ORDER — FUROSEMIDE 40 MG
20 TABLET ORAL ONCE
Refills: 0 | Status: COMPLETED | OUTPATIENT
Start: 2023-04-17 | End: 2023-04-17

## 2023-04-17 RX ORDER — ACETAMINOPHEN 500 MG
1000 TABLET ORAL ONCE
Refills: 0 | Status: COMPLETED | OUTPATIENT
Start: 2023-04-17 | End: 2023-04-17

## 2023-04-17 RX ORDER — AMIODARONE HYDROCHLORIDE 400 MG/1
200 TABLET ORAL DAILY
Refills: 0 | Status: CANCELLED | OUTPATIENT
Start: 2023-04-20 | End: 2023-04-18

## 2023-04-17 RX ADMIN — AMIODARONE HYDROCHLORIDE 400 MILLIGRAM(S): 400 TABLET ORAL at 02:15

## 2023-04-17 RX ADMIN — Medication 10 MILLIGRAM(S): at 03:30

## 2023-04-17 RX ADMIN — PANTOPRAZOLE SODIUM 40 MILLIGRAM(S): 20 TABLET, DELAYED RELEASE ORAL at 11:14

## 2023-04-17 RX ADMIN — HEPARIN SODIUM 5000 UNIT(S): 5000 INJECTION INTRAVENOUS; SUBCUTANEOUS at 13:48

## 2023-04-17 RX ADMIN — AMIODARONE HYDROCHLORIDE 133.33 MILLIGRAM(S): 400 TABLET ORAL at 00:30

## 2023-04-17 RX ADMIN — AMIODARONE HYDROCHLORIDE 400 MILLIGRAM(S): 400 TABLET ORAL at 19:01

## 2023-04-17 RX ADMIN — Medication 10 MILLIEQUIVALENT(S): at 11:14

## 2023-04-17 RX ADMIN — CLOPIDOGREL BISULFATE 75 MILLIGRAM(S): 75 TABLET, FILM COATED ORAL at 11:15

## 2023-04-17 RX ADMIN — HEPARIN SODIUM 5000 UNIT(S): 5000 INJECTION INTRAVENOUS; SUBCUTANEOUS at 21:51

## 2023-04-17 RX ADMIN — SODIUM CHLORIDE 3 MILLILITER(S): 9 INJECTION INTRAMUSCULAR; INTRAVENOUS; SUBCUTANEOUS at 06:52

## 2023-04-17 RX ADMIN — AMIODARONE HYDROCHLORIDE 400 MILLIGRAM(S): 400 TABLET ORAL at 09:51

## 2023-04-17 RX ADMIN — Medication 2: at 16:50

## 2023-04-17 RX ADMIN — Medication 1000 MILLIGRAM(S): at 06:10

## 2023-04-17 RX ADMIN — POTASSIUM PHOSPHATE, MONOBASIC POTASSIUM PHOSPHATE, DIBASIC 62.5 MILLIMOLE(S): 236; 224 INJECTION, SOLUTION INTRAVENOUS at 04:13

## 2023-04-17 RX ADMIN — HEPARIN SODIUM 5000 UNIT(S): 5000 INJECTION INTRAVENOUS; SUBCUTANEOUS at 06:59

## 2023-04-17 RX ADMIN — Medication 20 MILLIGRAM(S): at 06:58

## 2023-04-17 RX ADMIN — Medication 25 MILLIGRAM(S): at 08:28

## 2023-04-17 RX ADMIN — Medication 25 MILLIGRAM(S): at 02:17

## 2023-04-17 RX ADMIN — SODIUM CHLORIDE 3 MILLILITER(S): 9 INJECTION INTRAMUSCULAR; INTRAVENOUS; SUBCUTANEOUS at 21:59

## 2023-04-17 RX ADMIN — Medication 20 MILLIGRAM(S): at 15:22

## 2023-04-17 RX ADMIN — Medication 24 MILLIGRAM(S): at 09:51

## 2023-04-17 RX ADMIN — Medication 81 MILLIGRAM(S): at 11:15

## 2023-04-17 RX ADMIN — Medication 25 MILLIGRAM(S): at 19:01

## 2023-04-17 RX ADMIN — ATORVASTATIN CALCIUM 80 MILLIGRAM(S): 80 TABLET, FILM COATED ORAL at 21:51

## 2023-04-17 RX ADMIN — SENNA PLUS 2 TABLET(S): 8.6 TABLET ORAL at 21:51

## 2023-04-17 RX ADMIN — SODIUM CHLORIDE 3 MILLILITER(S): 9 INJECTION INTRAMUSCULAR; INTRAVENOUS; SUBCUTANEOUS at 13:43

## 2023-04-17 RX ADMIN — Medication 400 MILLIGRAM(S): at 05:55

## 2023-04-17 RX ADMIN — Medication 25 MILLIGRAM(S): at 13:48

## 2023-04-17 RX ADMIN — Medication 2: at 06:59

## 2023-04-17 NOTE — PROGRESS NOTE ADULT - SUBJECTIVE AND OBJECTIVE BOX
Patient discussed on morning rounds with Dr. Hernandez    Operation / Date: 4/14/23: MIDCAB (LIMA-LAD), EF normal    SUBJECTIVE ASSESSMENT:  63y Male         Vital Signs Last 24 Hrs  T(C): 36.2 (17 Apr 2023 09:33), Max: 37.2 (16 Apr 2023 17:31)  T(F): 97.2 (17 Apr 2023 09:33), Max: 98.9 (16 Apr 2023 17:31)  HR: 71 (17 Apr 2023 08:30) (68 - 158)  BP: 96/64 (17 Apr 2023 08:30) (96/64 - 150/82)  BP(mean): 76 (17 Apr 2023 08:30) (73 - 107)  RR: 19 (17 Apr 2023 08:30) (16 - 19)  SpO2: 93% (17 Apr 2023 08:30) (87% - 94%)    Parameters below as of 17 Apr 2023 08:30  Patient On (Oxygen Delivery Method): nasal cannula w/ humidification  O2 Flow (L/min): 4    I&O's Detail    16 Apr 2023 07:01  -  17 Apr 2023 07:00  --------------------------------------------------------  IN:    IV PiggyBack: 400 mL    IV PiggyBack: 252 mL    Oral Fluid: 990 mL  Total IN: 1642 mL    OUT:    Drain (mL): 30 mL    Voided (mL): 350 mL  Total OUT: 380 mL    Total NET: 1262 mL      17 Apr 2023 07:01  -  17 Apr 2023 10:30  --------------------------------------------------------  IN:  Total IN: 0 mL    OUT:    Drain (mL): 120 mL    Voided (mL): 480 mL  Total OUT: 600 mL    Total NET: -600 mL          CHEST TUBE:  Yes/No. AIR LEAKS: Yes/No. Suction / H2O SEAL.   ELAN DRAIN:  Yes/No.  EPICARDIAL WIRES: Yes/No.  TIE DOWNS: Yes/No.  DANIELSON: Yes/No.    PHYSICAL EXAM:    General:     Neurological:    Cardiovascular:    Respiratory:    Gastrointestinal:    Extremities:    Vascular:    Incision Sites:    LABS:                        12.3   15.65 )-----------( 261      ( 17 Apr 2023 05:30 )             37.2       COUMADIN:  Yes/No. REASON: .    PT/INR - ( 17 Apr 2023 05:30 )   PT: 13.2 sec;   INR: 1.11          PTT - ( 16 Apr 2023 21:52 )  PTT:27.9 sec    04-17    133<L>  |  94<L>  |  17  ----------------------------<  145<H>  4.1   |  27  |  1.08    Ca    8.6      17 Apr 2023 05:30  Phos  2.0     04-16  Mg     2.4     04-17    TPro  7.0  /  Alb  3.7  /  TBili  0.6  /  DBili  x   /  AST  16  /  ALT  11  /  AlkPhos  65  04-17          MEDICATIONS  (STANDING):  aMIOdarone    Tablet 400 milliGRAM(s) Oral every 8 hours  aMIOdarone    Tablet   Oral   aspirin  chewable 81 milliGRAM(s) Oral daily  atorvastatin 80 milliGRAM(s) Oral at bedtime  clopidogrel Tablet 75 milliGRAM(s) Oral daily  dextrose 5%. 1000 milliLiter(s) (100 mL/Hr) IV Continuous <Continuous>  dextrose 5%. 1000 milliLiter(s) (50 mL/Hr) IV Continuous <Continuous>  dextrose 50% Injectable 25 Gram(s) IV Push once  dextrose 50% Injectable 25 Gram(s) IV Push once  dextrose 50% Injectable 12.5 Gram(s) IV Push once  furosemide   Injectable 20 milliGRAM(s) IV Push daily  glucagon  Injectable 1 milliGRAM(s) IntraMuscular once  heparin   Injectable 5000 Unit(s) SubCutaneous every 8 hours  insulin lispro (ADMELOG) corrective regimen sliding scale   SubCutaneous Before meals and at bedtime  methylPREDNISolone   Oral   metoprolol tartrate 25 milliGRAM(s) Oral every 6 hours  pantoprazole    Tablet 40 milliGRAM(s) Oral daily  polyethylene glycol 3350 17 Gram(s) Oral daily  potassium chloride    Tablet ER 10 milliEquivalent(s) Oral daily  senna 2 Tablet(s) Oral at bedtime  sodium chloride 0.9% lock flush 3 milliLiter(s) IV Push every 8 hours  sodium chloride 0.9%. 1000 milliLiter(s) (10 mL/Hr) IV Continuous <Continuous>    MEDICATIONS  (PRN):  dextrose Oral Gel 15 Gram(s) Oral once PRN Blood Glucose LESS THAN 70 milliGRAM(s)/deciliter  oxyCODONE    IR 10 milliGRAM(s) Oral every 6 hours PRN Severe Pain (7 - 10)  oxyCODONE    IR 5 milliGRAM(s) Oral every 6 hours PRN Moderate Pain (4 - 6)        RADIOLOGY & ADDITIONAL TESTS: Patient discussed on morning rounds with Dr. Hernandez    Operation / Date: 4/14/23: MIDCAB (LIMA-LAD), EF normal    SUBJECTIVE ASSESSMENT:  63y Male seen and examined at bedside. He is feeling well today, offers no complaints. Voiding without incidence. + multiple BMs. Pulling 500-1000 on IS today.      Vital Signs Last 24 Hrs  T(C): 36.2 (17 Apr 2023 09:33), Max: 37.2 (16 Apr 2023 17:31)  T(F): 97.2 (17 Apr 2023 09:33), Max: 98.9 (16 Apr 2023 17:31)  HR: 71 (17 Apr 2023 08:30) (68 - 158)  BP: 96/64 (17 Apr 2023 08:30) (96/64 - 150/82)  BP(mean): 76 (17 Apr 2023 08:30) (73 - 107)  RR: 19 (17 Apr 2023 08:30) (16 - 19)  SpO2: 93% (17 Apr 2023 08:30) (87% - 94%)    Parameters below as of 17 Apr 2023 08:30  Patient On (Oxygen Delivery Method): nasal cannula w/ humidification  O2 Flow (L/min): 4    I&O's Detail    16 Apr 2023 07:01  -  17 Apr 2023 07:00  --------------------------------------------------------  IN:    IV PiggyBack: 400 mL    IV PiggyBack: 252 mL    Oral Fluid: 990 mL  Total IN: 1642 mL    OUT:    Drain (mL): 30 mL    Voided (mL): 350 mL  Total OUT: 380 mL    Total NET: 1262 mL      17 Apr 2023 07:01  -  17 Apr 2023 10:30  --------------------------------------------------------  IN:  Total IN: 0 mL    OUT:    Drain (mL): 120 mL    Voided (mL): 480 mL  Total OUT: 600 mL    Total NET: -600 mL          CHEST TUBE:  no  TOMMY DRAIN:  yes  EPICARDIAL WIRES: no  TIE DOWNS: yes  DANIELSON: no          PHYSICAL EXAM:  GEN: NAD, looks comfortable  Psych: Mood appropriate  Neuro: A&Ox3.  No focal deficits.  Moving all extremities.   HEENT: No obvious abnormalities  CV: S1S2, regular, no murmurs appreciated.  No carotid bruits.  No JVD  Lungs: Clear B/L.  No wheezing, rales or rhonchi  ABD: Soft, non-tender, non-distended.  +Bowel sounds  EXT: Warm and well perfused.  No peripheral edema noted  Musculoskeletal: Moving all extremities with normal ROM, no joint swelling  PV: Pedal pulses palpable  Incisions: Thoracotomy incision c/d/i. + 1 chest wall tommy in place        LABS:                        12.3   15.65 )-----------( 261      ( 17 Apr 2023 05:30 )             37.2       COUMADIN:  no    PT/INR - ( 17 Apr 2023 05:30 )   PT: 13.2 sec;   INR: 1.11          PTT - ( 16 Apr 2023 21:52 )  PTT:27.9 sec    04-17    133<L>  |  94<L>  |  17  ----------------------------<  145<H>  4.1   |  27  |  1.08    Ca    8.6      17 Apr 2023 05:30  Phos  2.0     04-16  Mg     2.4     04-17    TPro  7.0  /  Alb  3.7  /  TBili  0.6  /  DBili  x   /  AST  16  /  ALT  11  /  AlkPhos  65  04-17          MEDICATIONS  (STANDING):  aMIOdarone    Tablet 400 milliGRAM(s) Oral every 8 hours  aMIOdarone    Tablet   Oral   aspirin  chewable 81 milliGRAM(s) Oral daily  atorvastatin 80 milliGRAM(s) Oral at bedtime  clopidogrel Tablet 75 milliGRAM(s) Oral daily  dextrose 5%. 1000 milliLiter(s) (100 mL/Hr) IV Continuous <Continuous>  dextrose 5%. 1000 milliLiter(s) (50 mL/Hr) IV Continuous <Continuous>  dextrose 50% Injectable 25 Gram(s) IV Push once  dextrose 50% Injectable 25 Gram(s) IV Push once  dextrose 50% Injectable 12.5 Gram(s) IV Push once  furosemide   Injectable 20 milliGRAM(s) IV Push daily  glucagon  Injectable 1 milliGRAM(s) IntraMuscular once  heparin   Injectable 5000 Unit(s) SubCutaneous every 8 hours  insulin lispro (ADMELOG) corrective regimen sliding scale   SubCutaneous Before meals and at bedtime  methylPREDNISolone   Oral   metoprolol tartrate 25 milliGRAM(s) Oral every 6 hours  pantoprazole    Tablet 40 milliGRAM(s) Oral daily  polyethylene glycol 3350 17 Gram(s) Oral daily  potassium chloride    Tablet ER 10 milliEquivalent(s) Oral daily  senna 2 Tablet(s) Oral at bedtime  sodium chloride 0.9% lock flush 3 milliLiter(s) IV Push every 8 hours  sodium chloride 0.9%. 1000 milliLiter(s) (10 mL/Hr) IV Continuous <Continuous>    MEDICATIONS  (PRN):  dextrose Oral Gel 15 Gram(s) Oral once PRN Blood Glucose LESS THAN 70 milliGRAM(s)/deciliter  oxyCODONE    IR 10 milliGRAM(s) Oral every 6 hours PRN Severe Pain (7 - 10)  oxyCODONE    IR 5 milliGRAM(s) Oral every 6 hours PRN Moderate Pain (4 - 6)        RADIOLOGY & ADDITIONAL TESTS:

## 2023-04-17 NOTE — PROGRESS NOTE ADULT - ASSESSMENT
63 year male with history of HTN, HLD, SVT, systolic CHF with improved LVEF, CAD s/p PCI LPDA in June 2020 was sent to the ER at [facility] on 04/13/2023 by his cardiologist after having a positive stress test. Patient endorsed intermittent DOCKERY x few weeks and some mild chest discomfort. During the stress test patient developed shoulder and back pain, and was found to have profound ST depressions. Cardiac Catheterization showed  >90% proximal LAD stenosis at a trifurcation with a high-rising D1, and distal left main with moderate disease. Mild LCx. Non-dominant RCA. On 4/14/23 he underwent a MIDCAB with Dr. Hernandez. Brought to CTICU extubated. Patient was acidotic 2/2 hypoventilation. Patientt was placed on bipap and given narcan, resolved. He was weaned to HFNC and then NC. POD1 he ambulated and his CT was removed. Patient was transferred to [facility] in stable condition. POD 2 ***      Neurovascular  #Postoperative pain  - No delirium. Pain well controlled with current regimen.  - Continue *** PRN    Cardiovascular   - POD_ s/p _  - Hemodynamically stable. HR controlled (X-X)  - Hx of HTN, BP controlled (X-X)  - ASA, Plavix, BB, statin  - EKG. TTE. Cardiac Panel. Lipid Panel. BNP.      Respiratory  #Postoperative pulmonary insufficiency  - 02 Sat = 98% on RA.  - If on oxygen wean to RA from for O2 Sat > 93%.  - Encourage C+DB and Use of IS 10x / hr while awake.  - CXR.    GI   - Stable.  - Tolerating *** diet  - Protonix for GI prophylaxis    Renal/  - BUN/Cr stable at X/X  - Lasix ***  - Continue to monitor renal function.  - Monitor I/O's  - Replete electrolytes PRN    Endocrine    - A1c.  - TSH.    Hematologic  #Postoperative anemia  - H/H stable at X/X with no obvious signs of bleeding  - PTT  - INR    ID:  - Pt remains afebrile with no elevation in WBC or signs of infection  - Continue to monitor CBC  - Observe for SIRS/Sepsis Syndrome.    Prophylaxis:  - DVT prophylaxis with 5000 SubQ Heparin q8h.  - SCD's    Disposition:  - Home when medically appropriate.   63 year male with history of HTN, HLD, SVT, systolic CHF with improved LVEF, CAD s/p PCI LPDA in June 2020 was sent to the ER at [facility] on 04/13/2023 by his cardiologist after having a positive stress test. Patient endorsed intermittent DOCKERY x few weeks and some mild chest discomfort. During the stress test patient developed shoulder and back pain, and was found to have profound ST depressions. Cardiac Catheterization showed  >90% proximal LAD stenosis at a trifurcation with a high-rising D1, and distal left main with moderate disease. Mild LCx. Non-dominant RCA. On 4/14/23 he underwent a MIDCAB with Dr. Hernandez. Brought to [facility]ICU extubated. Patient was acidotic 2/2 hypoventilation. Patientt was placed on bipap and given narcan, resolved. He was weaned to HFNC and then NC. POD1 he ambulated and his CT was removed. Patient was transferred to [facility] in stable condition. POD 2 went into afib RVR 170s overnight, requiring amio bolus x 3 and PO amio load. Converted to NSR in AM POD 3. POD 3 started on Medrol Dosepak and diuresed.      Neurovascular  #Postoperative pain  - No delirium. Pain well controlled with current regimen.  - Continue oxycodone 10/5 PRN    Cardiovascular   #CAD now POD 3 from MIDCAB  - Continue ASA and Plavix  - Continue Metop 25 q6h  - Continue Lipitor 80  - Continue Medrol Dosepak  - Chest wall tommy still in place, 110cc output before 9 AM, will monitor and consider removing in afternoon  #Post op AF RVR, now NSR  - Continue PO amio load  - Continue beta blocker  - PACES team to see today, 4/17  #History of chronic systolic heart failure, now with normal EF  - Continue Lasix 20 IV daily  - Possibly restart home Aldactone if BP can tolerate    Respiratory  #Postoperative pulmonary insufficiency  - 02 Sat = 98% on 2L NC  - If on oxygen wean to RA from for O2 Sat > 93%.  - Encourage C+DB and Use of IS 10x / hr while awake.  - CXR with small left pleural effusion  - PA/lat CXR ordered for 4/18    GI   - Stable.  - Tolerating DASH diet  - Protonix for GI prophylaxis    Renal/  - BUN/Cr stable at 17/1.08  - Lasix 20 IV daily  - Continue to monitor renal function.  - Monitor I/O's  - Replete electrolytes PRN    Endocrine    - A1c 5.8. Continue ISS for now  - TSH 1.470    Hematologic  #Postoperative anemia  - H/H stable at 12.3/37.2 with no obvious signs of bleeding    ID:  - Pt remains afebrile with no elevation in WBC or signs of infection  - Continue to monitor CBC  - Observe for SIRS/Sepsis Syndrome.    Prophylaxis:  - DVT prophylaxis with 5000 SubQ Heparin q8h.  - SCD's    Disposition:  - Home when medically appropriate, likely 4/18

## 2023-04-17 NOTE — PROCEDURE NOTE - NSUS ED ADDITIONAL DETAIL1 FT
Small left pleural effusion  No right pleural effusion  Gave additional dose of IV lasix  Will continue to try weaning O2

## 2023-04-18 ENCOUNTER — TRANSCRIPTION ENCOUNTER (OUTPATIENT)
Age: 63
End: 2023-04-18

## 2023-04-18 VITALS — TEMPERATURE: 98 F

## 2023-04-18 PROBLEM — E78.5 HYPERLIPIDEMIA, UNSPECIFIED: Chronic | Status: ACTIVE | Noted: 2023-04-14

## 2023-04-18 PROBLEM — K21.9 GASTRO-ESOPHAGEAL REFLUX DISEASE WITHOUT ESOPHAGITIS: Chronic | Status: ACTIVE | Noted: 2023-04-14

## 2023-04-18 PROBLEM — I10 ESSENTIAL (PRIMARY) HYPERTENSION: Chronic | Status: ACTIVE | Noted: 2023-04-14

## 2023-04-18 PROBLEM — I47.1 SUPRAVENTRICULAR TACHYCARDIA: Chronic | Status: ACTIVE | Noted: 2023-04-14

## 2023-04-18 PROBLEM — Z95.5 PRESENCE OF CORONARY ANGIOPLASTY IMPLANT AND GRAFT: Chronic | Status: ACTIVE | Noted: 2023-04-14

## 2023-04-18 PROBLEM — I49.3 VENTRICULAR PREMATURE DEPOLARIZATION: Chronic | Status: ACTIVE | Noted: 2023-04-14

## 2023-04-18 PROBLEM — Z87.19 PERSONAL HISTORY OF OTHER DISEASES OF THE DIGESTIVE SYSTEM: Chronic | Status: ACTIVE | Noted: 2023-04-14

## 2023-04-18 LAB
ANION GAP SERPL CALC-SCNC: 13 MMOL/L — SIGNIFICANT CHANGE UP (ref 5–17)
BUN SERPL-MCNC: 18 MG/DL — SIGNIFICANT CHANGE UP (ref 7–23)
CALCIUM SERPL-MCNC: 8.7 MG/DL — SIGNIFICANT CHANGE UP (ref 8.4–10.5)
CHLORIDE SERPL-SCNC: 97 MMOL/L — SIGNIFICANT CHANGE UP (ref 96–108)
CO2 SERPL-SCNC: 25 MMOL/L — SIGNIFICANT CHANGE UP (ref 22–31)
CREAT SERPL-MCNC: 1.07 MG/DL — SIGNIFICANT CHANGE UP (ref 0.5–1.3)
EGFR: 78 ML/MIN/1.73M2 — SIGNIFICANT CHANGE UP
GLUCOSE BLDC GLUCOMTR-MCNC: 170 MG/DL — HIGH (ref 70–99)
GLUCOSE BLDC GLUCOMTR-MCNC: 99 MG/DL — SIGNIFICANT CHANGE UP (ref 70–99)
GLUCOSE SERPL-MCNC: 127 MG/DL — HIGH (ref 70–99)
HCT VFR BLD CALC: 38.8 % — LOW (ref 39–50)
HGB BLD-MCNC: 12.7 G/DL — LOW (ref 13–17)
MAGNESIUM SERPL-MCNC: 2.5 MG/DL — SIGNIFICANT CHANGE UP (ref 1.6–2.6)
MCHC RBC-ENTMCNC: 30.9 PG — SIGNIFICANT CHANGE UP (ref 27–34)
MCHC RBC-ENTMCNC: 32.7 GM/DL — SIGNIFICANT CHANGE UP (ref 32–36)
MCV RBC AUTO: 94.4 FL — SIGNIFICANT CHANGE UP (ref 80–100)
NRBC # BLD: 0 /100 WBCS — SIGNIFICANT CHANGE UP (ref 0–0)
PLATELET # BLD AUTO: 312 K/UL — SIGNIFICANT CHANGE UP (ref 150–400)
POTASSIUM SERPL-MCNC: 3.8 MMOL/L — SIGNIFICANT CHANGE UP (ref 3.5–5.3)
POTASSIUM SERPL-SCNC: 3.8 MMOL/L — SIGNIFICANT CHANGE UP (ref 3.5–5.3)
RBC # BLD: 4.11 M/UL — LOW (ref 4.2–5.8)
RBC # FLD: 13.3 % — SIGNIFICANT CHANGE UP (ref 10.3–14.5)
SODIUM SERPL-SCNC: 135 MMOL/L — SIGNIFICANT CHANGE UP (ref 135–145)
WBC # BLD: 16.05 K/UL — HIGH (ref 3.8–10.5)
WBC # FLD AUTO: 16.05 K/UL — HIGH (ref 3.8–10.5)

## 2023-04-18 PROCEDURE — 84443 ASSAY THYROID STIM HORMONE: CPT

## 2023-04-18 PROCEDURE — S2900: CPT

## 2023-04-18 PROCEDURE — 83880 ASSAY OF NATRIURETIC PEPTIDE: CPT

## 2023-04-18 PROCEDURE — 85014 HEMATOCRIT: CPT

## 2023-04-18 PROCEDURE — 85027 COMPLETE CBC AUTOMATED: CPT

## 2023-04-18 PROCEDURE — 94002 VENT MGMT INPAT INIT DAY: CPT

## 2023-04-18 PROCEDURE — 80048 BASIC METABOLIC PNL TOTAL CA: CPT

## 2023-04-18 PROCEDURE — 86891 AUTOLOGOUS BLOOD OP SALVAGE: CPT

## 2023-04-18 PROCEDURE — 85730 THROMBOPLASTIN TIME PARTIAL: CPT

## 2023-04-18 PROCEDURE — 82330 ASSAY OF CALCIUM: CPT

## 2023-04-18 PROCEDURE — 86923 COMPATIBILITY TEST ELECTRIC: CPT

## 2023-04-18 PROCEDURE — 84484 ASSAY OF TROPONIN QUANT: CPT

## 2023-04-18 PROCEDURE — 93010 ELECTROCARDIOGRAM REPORT: CPT

## 2023-04-18 PROCEDURE — C1751: CPT

## 2023-04-18 PROCEDURE — 84132 ASSAY OF SERUM POTASSIUM: CPT

## 2023-04-18 PROCEDURE — 97116 GAIT TRAINING THERAPY: CPT

## 2023-04-18 PROCEDURE — 84295 ASSAY OF SERUM SODIUM: CPT

## 2023-04-18 PROCEDURE — 81003 URINALYSIS AUTO W/O SCOPE: CPT

## 2023-04-18 PROCEDURE — 71045 X-RAY EXAM CHEST 1 VIEW: CPT

## 2023-04-18 PROCEDURE — 82962 GLUCOSE BLOOD TEST: CPT

## 2023-04-18 PROCEDURE — C1889: CPT

## 2023-04-18 PROCEDURE — 85610 PROTHROMBIN TIME: CPT

## 2023-04-18 PROCEDURE — 97162 PT EVAL MOD COMPLEX 30 MIN: CPT

## 2023-04-18 PROCEDURE — 93880 EXTRACRANIAL BILAT STUDY: CPT

## 2023-04-18 PROCEDURE — C9399: CPT

## 2023-04-18 PROCEDURE — 82947 ASSAY GLUCOSE BLOOD QUANT: CPT

## 2023-04-18 PROCEDURE — 80061 LIPID PANEL: CPT

## 2023-04-18 PROCEDURE — 71046 X-RAY EXAM CHEST 2 VIEWS: CPT | Mod: 26

## 2023-04-18 PROCEDURE — 84100 ASSAY OF PHOSPHORUS: CPT

## 2023-04-18 PROCEDURE — 86803 HEPATITIS C AB TEST: CPT

## 2023-04-18 PROCEDURE — 93005 ELECTROCARDIOGRAM TRACING: CPT

## 2023-04-18 PROCEDURE — 82550 ASSAY OF CK (CPK): CPT

## 2023-04-18 PROCEDURE — 83735 ASSAY OF MAGNESIUM: CPT

## 2023-04-18 PROCEDURE — 94150 VITAL CAPACITY TEST: CPT

## 2023-04-18 PROCEDURE — 80053 COMPREHEN METABOLIC PANEL: CPT

## 2023-04-18 PROCEDURE — 83036 HEMOGLOBIN GLYCOSYLATED A1C: CPT

## 2023-04-18 PROCEDURE — 86850 RBC ANTIBODY SCREEN: CPT

## 2023-04-18 PROCEDURE — 86900 BLOOD TYPING SEROLOGIC ABO: CPT

## 2023-04-18 PROCEDURE — 93306 TTE W/DOPPLER COMPLETE: CPT

## 2023-04-18 PROCEDURE — 86901 BLOOD TYPING SEROLOGIC RH(D): CPT

## 2023-04-18 PROCEDURE — 85576 BLOOD PLATELET AGGREGATION: CPT

## 2023-04-18 PROCEDURE — 82553 CREATINE MB FRACTION: CPT

## 2023-04-18 PROCEDURE — 82803 BLOOD GASES ANY COMBINATION: CPT

## 2023-04-18 PROCEDURE — 36415 COLL VENOUS BLD VENIPUNCTURE: CPT

## 2023-04-18 PROCEDURE — 85025 COMPLETE CBC W/AUTO DIFF WBC: CPT

## 2023-04-18 PROCEDURE — 71046 X-RAY EXAM CHEST 2 VIEWS: CPT

## 2023-04-18 RX ORDER — FUROSEMIDE 40 MG
1 TABLET ORAL
Qty: 5 | Refills: 0
Start: 2023-04-18 | End: 2023-04-22

## 2023-04-18 RX ORDER — ATORVASTATIN CALCIUM 80 MG/1
1 TABLET, FILM COATED ORAL
Qty: 30 | Refills: 0
Start: 2023-04-18 | End: 2023-05-17

## 2023-04-18 RX ORDER — METOPROLOL TARTRATE 50 MG
50 TABLET ORAL EVERY 12 HOURS
Refills: 0 | Status: DISCONTINUED | OUTPATIENT
Start: 2023-04-18 | End: 2023-04-18

## 2023-04-18 RX ORDER — POTASSIUM CHLORIDE 20 MEQ
1 PACKET (EA) ORAL
Qty: 5 | Refills: 0
Start: 2023-04-18 | End: 2023-04-22

## 2023-04-18 RX ORDER — METOPROLOL TARTRATE 50 MG
1 TABLET ORAL
Qty: 60 | Refills: 0
Start: 2023-04-18 | End: 2023-05-17

## 2023-04-18 RX ORDER — POLYETHYLENE GLYCOL 3350 17 G/17G
17 POWDER, FOR SOLUTION ORAL
Qty: 510 | Refills: 0
Start: 2023-04-18 | End: 2023-05-17

## 2023-04-18 RX ORDER — METOPROLOL TARTRATE 50 MG
25 TABLET ORAL ONCE
Refills: 0 | Status: COMPLETED | OUTPATIENT
Start: 2023-04-18 | End: 2023-04-18

## 2023-04-18 RX ORDER — AMIODARONE HYDROCHLORIDE 400 MG/1
2 TABLET ORAL
Qty: 60 | Refills: 0
Start: 2023-04-18 | End: 2023-05-17

## 2023-04-18 RX ORDER — OXYCODONE HYDROCHLORIDE 5 MG/1
1 TABLET ORAL
Qty: 20 | Refills: 0
Start: 2023-04-18 | End: 2023-04-24

## 2023-04-18 RX ORDER — ASPIRIN/CALCIUM CARB/MAGNESIUM 324 MG
1 TABLET ORAL
Qty: 90 | Refills: 0
Start: 2023-04-18 | End: 2023-07-16

## 2023-04-18 RX ORDER — FUROSEMIDE 40 MG
20 TABLET ORAL DAILY
Refills: 0 | Status: DISCONTINUED | OUTPATIENT
Start: 2023-04-19 | End: 2023-04-18

## 2023-04-18 RX ORDER — SENNA PLUS 8.6 MG/1
2 TABLET ORAL
Qty: 60 | Refills: 0
Start: 2023-04-18 | End: 2023-05-17

## 2023-04-18 RX ORDER — POTASSIUM CHLORIDE 20 MEQ
20 PACKET (EA) ORAL ONCE
Refills: 0 | Status: COMPLETED | OUTPATIENT
Start: 2023-04-18 | End: 2023-04-18

## 2023-04-18 RX ORDER — PANTOPRAZOLE SODIUM 20 MG/1
1 TABLET, DELAYED RELEASE ORAL
Qty: 30 | Refills: 0
Start: 2023-04-18 | End: 2023-05-17

## 2023-04-18 RX ADMIN — Medication 20 MILLIEQUIVALENT(S): at 08:02

## 2023-04-18 RX ADMIN — AMIODARONE HYDROCHLORIDE 400 MILLIGRAM(S): 400 TABLET ORAL at 10:19

## 2023-04-18 RX ADMIN — AMIODARONE HYDROCHLORIDE 400 MILLIGRAM(S): 400 TABLET ORAL at 01:03

## 2023-04-18 RX ADMIN — PANTOPRAZOLE SODIUM 40 MILLIGRAM(S): 20 TABLET, DELAYED RELEASE ORAL at 11:58

## 2023-04-18 RX ADMIN — Medication 4 MILLIGRAM(S): at 10:19

## 2023-04-18 RX ADMIN — Medication 4 MILLIGRAM(S): at 12:58

## 2023-04-18 RX ADMIN — Medication 25 MILLIGRAM(S): at 01:03

## 2023-04-18 RX ADMIN — Medication 81 MILLIGRAM(S): at 11:58

## 2023-04-18 RX ADMIN — Medication 25 MILLIGRAM(S): at 07:39

## 2023-04-18 RX ADMIN — Medication 10 MILLIEQUIVALENT(S): at 11:58

## 2023-04-18 RX ADMIN — SODIUM CHLORIDE 3 MILLILITER(S): 9 INJECTION INTRAMUSCULAR; INTRAVENOUS; SUBCUTANEOUS at 12:56

## 2023-04-18 NOTE — DISCHARGE NOTE PROVIDER - NSDCCPTREATMENT_GEN_ALL_CORE_FT
PRINCIPAL PROCEDURE  Procedure: Minimally invasive direct coronary artery bypass (MIDCAB) with transesophageal echocardiography (DOUG)  Findings and Treatment: MIDCAB (LIMA-LAD), EF normal

## 2023-04-18 NOTE — DISCHARGE NOTE PROVIDER - NSDCFUADDINST_GEN_ALL_CORE_FT
-Walk daily as tolerated and use your incentive spirometer 10 times every hour while you are awake.     -Please weigh yourself daily. If you notice over a 3 pound weight gain in 3 days, this is a sign you are likely retaining too much fluid. It is imperative you call our right away with unexplained rapid weight gain.      -Please continue to wear the compression stockings given to you in the hospital at home. This is a way to prevent fluid from building up in your legs.     -No driving or strenuous activity/exercise until cleared by your surgeon.    -Gently clean your incisions with unscented/antibacterial soap and water, pat dry.  You may leave them open to air.    -Call your doctor if you have shortness of breath, chest pain not relieved by pain medication, dizziness, fever >100.5, or increased redness or drainage from incisions.   You were enrolled in a clinical study called Anticoagulation for New-Onset Post-Operative Atrial Fibrillation after CABG (PACeS). It is important that you remain on the assigned medication(s) – insert antiplatelet/ anticoagulant name(s) - for the protocol-specified treatment period. Thank you for your participation of this important trial. Please contact the research team if you have any questions.   Research Team  Dr. Nikolai Hernandez: [phone]  Research Coordinator: [phone] or [phone]  Director Clinical Research Galindo Saenz: [phone]

## 2023-04-18 NOTE — DISCHARGE NOTE PROVIDER - HOSPITAL COURSE
Patient discussed on morning rounds with Dr. Skinny Adams    Operation Date:4/14/23 MIDCAB EF NL    Primary Surgeon/Attending MD: David    Referring Physician: Delfina Ferguson    _ _ _ _ _ _ _ _ _ _ _ _    HOSPITAL COURSE:  63 year male with history of HTN, HLD, SVT, systolic CHF with improved LVEF, CAD s/p PCI LPDA in June 2020 was sent to the ER at [facility] on 04/13/2023 by his cardiologist after having a positive stress test. Patient endorsed intermittent DOCKERY x few weeks and some mild chest discomfort. During the stress test patient developed shoulder and back pain, and was found to have profound ST depressions. Cardiac Catheterization showed  >90% proximal LAD stenosis at a trifurcation with a high-rising D1, and distal left main with moderate disease. Mild LCx. Non-dominant RCA. On 4/14/23 he underwent a MIDCAB with Dr. Hernandez. Brought to CTICU extubated. Patient was acidotic 2/2 hypoventilation. Patient was placed on bipap and given narcan, resolved. He was weaned to HFNC and then NC. POD1 he ambulated and his CT was removed. Patient was transferred to [facility] in stable condition. POD 2 went into afib RVR 170s overnight, requiring amio bolus x 3 and PO amio load. Converted to NSR in AM POD 3. POD 3 started on Medrol Dosepak and diuresed.  POD 4 IV diuretics transitioned to PO, enrolled in paces, randomized to ASA 81mg only, Patient ambulating independently with room air, tolerating diet, pain controlled, urinating and having bowel movements.  Patient for discharge home.     _ _ _ _ _ _ _ _ _ _ _ _    DISCHARGE PHYSICAL EXAM:    GEN: NAD, looks comfortable  Psych: Mood appropriate  Neuro: A&Ox3.  No focal deficits.  Moving all extremities.   HEENT: No obvious abnormalities  CV: S1S2, regular, no murmurs appreciated.  No carotid bruits.  No JVD  Lungs: Clear B/L.  No wheezing, rales or rhonchi  ABD: Soft, non-tender, non-distended.  +Bowel sounds  EXT: Warm and well perfused.  No peripheral edema noted  Musculoskeletal: Moving all extremities with normal ROM, no joint swelling  PV: Pedal pulses palpable  Incision: Left thoractomy incision CDI.  No drainage or erythema  _ _ _ _ _ _ _ _ _ _ _ _    REMOVAL CHECKLIST:        [X ] Stitches/tie downs, If no, why? ______  _ _ _ _ _ _ _ _ _ _ _ _    MEDICATION DISCHARGE CHECKLIST    CABG    [ X] Aspirin, [ ] Contraindicated, Reason_______________________________    [ ] Plavix, [X] Contraindicated, Reason____PACES, ASA 81mg only___________________________    [ X] Statin, [ ] Contraindicated, Reason_______________________________    [X ] Lasix , [ ] Contraindicated, Reason_______________________________          _ _ _ _ _ _ _ _ _ _ _ _

## 2023-04-18 NOTE — DISCHARGE NOTE PROVIDER - NSDCCPCAREPLAN_GEN_ALL_CORE_FT
PRINCIPAL DISCHARGE DIAGNOSIS  Diagnosis: CAD (coronary artery disease)  Assessment and Plan of Treatment:       SECONDARY DISCHARGE DIAGNOSES  Diagnosis: Mild HTN  Assessment and Plan of Treatment:     Diagnosis: HLD (hyperlipidemia)  Assessment and Plan of Treatment:

## 2023-04-18 NOTE — DISCHARGE NOTE PROVIDER - CARE PROVIDER_API CALL
Nikolai Hernandez)  Cardiovascular Surgery  [address] [place] [address]  Phone: [phone]  Fax: [phone]  Follow Up Time: 1 week    Charanjit Ferguson)  Cardiovascular Disease; Internal Medicine; Interventional Cardiology  [address] [id number]  Phone: [phone]  Fax: [phone]  Follow Up Time: 2 weeks

## 2023-04-18 NOTE — DISCHARGE NOTE PROVIDER - NSDCFUSCHEDAPPT_GEN_ALL_CORE_FT
Nikolai Hernandez  [facility] Physician [facility]  CTS[facility] [address]  Scheduled Appointment: 04/25/2023

## 2023-04-18 NOTE — DISCHARGE NOTE NURSING/CASE MANAGEMENT/SOCIAL WORK - PATIENT PORTAL LINK FT
You can access the FollowMyHealth Patient Portal offered by [facility] by registering at the following website: [email or web address]. By joining [employer]’s FollowMyHealth portal, you will also be able to view your health information using other applications (apps) compatible with our system.

## 2023-04-18 NOTE — DISCHARGE NOTE PROVIDER - NSDCMRMEDTOKEN_GEN_ALL_CORE_FT
amiodarone 200 mg oral tablet: 2 tab(s) orally 2 times a day until april 22nd  From April 23rd take 1 tablet orally once a day  aspirin 81 mg oral tablet, chewable: 1 tab(s) orally once a day  atorvastatin 80 mg oral tablet: 1 tab(s) orally once a day (at bedtime)  furosemide 20 mg oral tablet: 1 tab(s) orally once a day  Medrol Dosepak 4 mg oral tablet: 1 tab(s) orally Take from dinner time day #2  metoprolol tartrate 50 mg oral tablet: 1 tab(s) orally every 12 hours  oxyCODONE 5 mg oral tablet: 1 tab(s) orally every 6 hours as needed for Moderate Pain (4 - 6) MDD: 4  pantoprazole 40 mg oral delayed release tablet: 1 tab(s) orally once a day  polyethylene glycol 3350 oral powder for reconstitution: 17 gram(s) orally once a day as needed for  constipation  potassium chloride 10 mEq oral tablet, extended release: 1 tab(s) orally once a day  senna leaf extract oral tablet: 2 tab(s) orally once a day (at bedtime) as needed for  constipation

## 2023-04-19 ENCOUNTER — APPOINTMENT (OUTPATIENT)
Dept: CARE COORDINATION | Facility: HOME HEALTH | Age: 63
End: 2023-04-19
Payer: MEDICARE

## 2023-04-19 VITALS — HEART RATE: 60 BPM | OXYGEN SATURATION: 96 % | WEIGHT: 175 LBS

## 2023-04-19 DIAGNOSIS — Z09 ENCOUNTER FOR FOLLOW-UP EXAMINATION AFTER COMPLETED TREATMENT FOR CONDITIONS OTHER THAN MALIGNANT NEOPLASM: ICD-10-CM

## 2023-04-19 DIAGNOSIS — Z86.79 PERSONAL HISTORY OF OTHER DISEASES OF THE CIRCULATORY SYSTEM: ICD-10-CM

## 2023-04-19 DIAGNOSIS — I25.10 ATHEROSCLEROTIC HEART DISEASE OF NATIVE CORONARY ARTERY W/OUT ANGINA PECTORIS: ICD-10-CM

## 2023-04-19 DIAGNOSIS — Z00.6 ENCOUNTER FOR EXAMINATION FOR NORMAL COMPARISON AND CONTROL IN CLINICAL RESEARCH PROGRAM: ICD-10-CM

## 2023-04-19 DIAGNOSIS — Z87.19 PERSONAL HISTORY OF OTHER DISEASES OF THE DIGESTIVE SYSTEM: ICD-10-CM

## 2023-04-19 DIAGNOSIS — Z95.5 PRESENCE OF CORONARY ANGIOPLASTY IMPLANT AND GRAFT: ICD-10-CM

## 2023-04-19 DIAGNOSIS — Z86.39 PERSONAL HISTORY OF OTHER ENDOCRINE, NUTRITIONAL AND METABOLIC DISEASE: ICD-10-CM

## 2023-04-19 DIAGNOSIS — I50.22 CHRONIC SYSTOLIC (CONGESTIVE) HEART FAILURE: ICD-10-CM

## 2023-04-19 DIAGNOSIS — Z82.49 FAMILY HISTORY OF ISCHEMIC HEART DISEASE AND OTHER DISEASES OF THE CIRCULATORY SYSTEM: ICD-10-CM

## 2023-04-19 DIAGNOSIS — Z95.1 PRESENCE OF AORTOCORONARY BYPASS GRAFT: ICD-10-CM

## 2023-04-19 PROCEDURE — 99024 POSTOP FOLLOW-UP VISIT: CPT

## 2023-04-19 RX ORDER — METOPROLOL TARTRATE 50 MG/1
50 TABLET, FILM COATED ORAL
Qty: 60 | Refills: 3 | Status: ACTIVE | COMMUNITY

## 2023-04-19 RX ORDER — PANTOPRAZOLE 40 MG/1
40 TABLET, DELAYED RELEASE ORAL DAILY
Qty: 30 | Refills: 0 | Status: ACTIVE | COMMUNITY

## 2023-04-19 RX ORDER — ASPIRIN 81 MG
81 TABLET, DELAYED RELEASE (ENTERIC COATED) ORAL DAILY
Qty: 30 | Refills: 6 | Status: ACTIVE | COMMUNITY

## 2023-04-19 RX ORDER — OXYCODONE 5 MG/1
5 TABLET ORAL
Refills: 0 | Status: ACTIVE | COMMUNITY

## 2023-04-19 RX ORDER — METHYLPREDNISOLONE 4 MG/1
4 TABLET ORAL
Refills: 0 | Status: ACTIVE | COMMUNITY

## 2023-04-19 RX ORDER — ATORVASTATIN CALCIUM 80 MG/1
80 TABLET, FILM COATED ORAL
Qty: 1 | Refills: 1 | Status: ACTIVE | COMMUNITY

## 2023-04-19 NOTE — ASSESSMENT
[FreeTextEntry1] : Pt recovering well at home s/p midcab. Reviewed all medications and dosages with pt understanding. Pt dispenses meds appropriately into med dispenser each week. Pt has all medications in home and is taking as prescribed. Denies pain at this time. No new symptoms, issues or concerns to report at this time. Appt schedule reviewed with pt verbalizing understanding.\par

## 2023-04-19 NOTE — PHYSICAL EXAM
[Neck Appearance] : the appearance of the neck was normal [FreeTextEntry1] : sternotomy and CT sites without erythema with edges well approximated. Sternum stable. minimal serosangenous drainage noted from CT site. \par \par   [Musculoskeletal - Swelling] : no joint swelling seen [Skin Color & Pigmentation] : normal skin color and pigmentation [] : no rash [No Focal Deficits] : no focal deficits [Cranial Nerves] : cranial nerves 2-12 were intact [Oriented To Time, Place, And Person] : oriented to person, place, and time

## 2023-04-19 NOTE — HISTORY OF PRESENT ILLNESS
[FreeTextEntry1] : Hospital Course: \par Discharge Date	18-Apr-2023 \par Admission Date	13-Apr-2023 22:00 \par \par Operation Date:4/14/23 MIDCAB EF NL \par \par Primary Surgeon/Attending MD: David \par \par Referring Physician: Delfina Ferguson \par HOSPITAL COURSE: \par 63 year male with history of HTN, HLD, SVT, systolic CHF with improved LVEF, \par CAD s/p PCI LPDA in June 2020 was sent to the ER at [facility] on 04/13/2023 by his \[facility] cardiologist after having a positive stress test. Patient endorsed intermittent \par DOCKERY x few weeks and some mild chest discomfort. During the stress test patient \par developed shoulder and back pain, and was found to have profound ST \par depressions. Cardiac Catheterization showed  >90% proximal LAD stenosis at a \par trifurcation with a high-rising D1, and distal left main with moderate disease. \par Mild LCx. Non-dominant RCA. On 4/14/23 he underwent a MIDCAB with Dr. Hernandez. \par Brought to CTICU extubated. Patient was acidotic 2/2 hypoventilation. Patient \par was placed on bipap and given narcan, resolved. He was weaned to HFNC and then \par NC. POD1 he ambulated and his CT was removed. Patient was transferred to \par [facility] in stable condition. POD 2 went into afib RVR 170s overnight, \par requiring amio bolus x 3 and PO amio load. Converted to NSR in AM POD 3. POD 3 \par started on Medrol Dosepak and diuresed.  POD 4 IV diuretics transitioned to PO, \par enrolled in paces, randomized to ASA 81mg only, Patient ambulating \par independently with room air, tolerating diet, pain controlled, urinating and \par having bowel movements.  Patient was discharge home in stable conditon. \par \par Today patient was seen for visit s/p discharge from  [facility]. Patient informed they are being followed by [facility] program. Time was spent with the patient reviewing the discharge material including medications, follow up appointments, recovery, concerning symptoms, and how to contact me should they have questions. \par   \par \[facility]

## 2023-04-23 ENCOUNTER — RESULT REVIEW (OUTPATIENT)
Age: 63
End: 2023-04-23

## 2023-04-25 ENCOUNTER — APPOINTMENT (OUTPATIENT)
Dept: CARDIOTHORACIC SURGERY | Facility: CLINIC | Age: 63
End: 2023-04-25
Payer: MEDICARE

## 2023-04-25 ENCOUNTER — TRANSCRIPTION ENCOUNTER (OUTPATIENT)
Age: 63
End: 2023-04-25

## 2023-04-25 PROCEDURE — 99024 POSTOP FOLLOW-UP VISIT: CPT

## 2023-04-26 NOTE — CONSULT LETTER
[FreeTextEntry2] : Elan Holbrook MD [FreeTextEntry1] : STEFAN CABRAL  was seen today for a post operative visit. He is doing well status post Robotic assisted MIDCAB x 1 (LIMA->LAD) on April 14, 2023.  We have asked that the patient followup in your office. We are requesting that he have a EKG in 90 days as part of PACeS Trial.. Enclosed is a copy of the operative report. Please contact our office for any questions or concerns at [phone].\par \par Thank you for allowing us to participate in this patients care.\par  [FreeTextEntry3] : Nikolai Hernandez MD, FRCS\par \par [facility] \par Department of Cardiothoracic  Surgery \par Director of Robotic Cardiac Surgery\par Professor of Cardiovascular & Thoracic Surgery\par [employer] \par \par STACIE TorresP\par

## 2023-04-26 NOTE — PHYSICAL EXAM
[Clean] : clean [Dry] : dry [Healing Well] : healing well [FreeTextEntry2] : left ant mini thoracotomy

## 2023-04-26 NOTE — DISCUSSION/SUMMARY
[Doing Well] : is doing well [1] : 1 [FreeTextEntry1] : Plan: \par Continue current medication regimen.\par Continue to increase activity and walk daily as tolerated. Continue to use incentive spirometer. \par No driving or strenuous activity for 4 weeks after surgery. Avoid lifting >10 to 15 lbs.\par Call MD if you experience fever, fatigue, dizziness, confusion, syncope, shortness of breath, chest pain not relieved with analgesics, increased redness/drainage from incision.\par Follow up with Dr. Holbrook\par stop protonic in 1 month\par continue ASA 81mg daily x 90days per PACeS Protocol\par EKG in 90days per PACeS protocol\par stop amiodarone in 2 weeks\par Follow up in [facility] clinic as needed\par \par

## 2023-04-26 NOTE — REASON FOR VISIT
[Home] : at home, [unfilled] , at the time of the visit. [Medical Office: ([facility])___] : at the medical office located in  [Patient] : the patient [de-identified] : Robotic MIDCAB x 1 (LIMA->LAD) [de-identified] : 4/14/23 [de-identified] : Intraop uncomplicated. Extubated in OR. In ICU required BIPAP and narcan, then weaned to HFNC. POD 2 went into afib RVR 170s overnight, requiring amio bolus x 3 and PO amio load. Converted to NSR. POD 3 \par started on Medrol Dosepak and diuresed.  POD 4 Enrolled in PACeSc Trial and randomized to ASA 81mg only x 90 days. Patient discharged home on 4/18. \par Patient seen via [facility]. He appears well, NAD. He reports walking 1-2 miles daily and feeling "great". He denies c/o chest pain, sob/ramirez, fever, lower extremity edema, syncope or palpitations. \par 4/24 Chest xray @ [facility] wnl, no effusion\par

## 2023-04-26 NOTE — END OF VISIT
[FreeTextEntry3] : I, REGINA MOORE , am scribing for and in the presence of LJ MCCLELLAN the following sections: History of present illness, past Medical/family/surgical/family/social history, review of systems, vital signs, physical exam and disposition.\par I personally performed the services described in the documentation, reviewed the documentation recorded by the scribe in my presence and it accurately and completely records my words and actions.\par

## 2023-04-27 DIAGNOSIS — Z79.899 OTHER LONG TERM (CURRENT) DRUG THERAPY: ICD-10-CM

## 2023-04-27 DIAGNOSIS — I97.190 OTHER POSTPROCEDURAL CARDIAC FUNCTIONAL DISTURBANCES FOLLOWING CARDIAC SURGERY: ICD-10-CM

## 2023-04-27 DIAGNOSIS — I48.91 UNSPECIFIED ATRIAL FIBRILLATION: ICD-10-CM

## 2023-04-27 DIAGNOSIS — J95.1 ACUTE PULMONARY INSUFFICIENCY FOLLOWING THORACIC SURGERY: ICD-10-CM

## 2023-04-27 DIAGNOSIS — I25.10 ATHEROSCLEROTIC HEART DISEASE OF NATIVE CORONARY ARTERY WITHOUT ANGINA PECTORIS: ICD-10-CM

## 2023-04-27 DIAGNOSIS — K21.9 GASTRO-ESOPHAGEAL REFLUX DISEASE WITHOUT ESOPHAGITIS: ICD-10-CM

## 2023-04-27 DIAGNOSIS — I50.22 CHRONIC SYSTOLIC (CONGESTIVE) HEART FAILURE: ICD-10-CM

## 2023-04-27 DIAGNOSIS — E87.29 OTHER ACIDOSIS: ICD-10-CM

## 2023-04-27 DIAGNOSIS — E78.5 HYPERLIPIDEMIA, UNSPECIFIED: ICD-10-CM

## 2023-04-27 DIAGNOSIS — Z88.8 ALLERGY STATUS TO OTHER DRUGS, MEDICAMENTS AND BIOLOGICAL SUBSTANCES: ICD-10-CM

## 2023-04-27 DIAGNOSIS — R73.09 OTHER ABNORMAL GLUCOSE: ICD-10-CM

## 2023-04-27 DIAGNOSIS — I77.810 THORACIC AORTIC ECTASIA: ICD-10-CM

## 2023-04-27 DIAGNOSIS — Z79.82 LONG TERM (CURRENT) USE OF ASPIRIN: ICD-10-CM

## 2023-04-27 DIAGNOSIS — I11.0 HYPERTENSIVE HEART DISEASE WITH HEART FAILURE: ICD-10-CM

## 2023-05-05 ENCOUNTER — NON-APPOINTMENT (OUTPATIENT)
Age: 63
End: 2023-05-05

## 2023-05-05 RX ORDER — AMLODIPINE BESYLATE 2.5 MG/1
2.5 TABLET ORAL DAILY
Qty: 30 | Refills: 0 | Status: ACTIVE | COMMUNITY
Start: 2023-05-05 | End: 1900-01-01

## 2023-05-05 RX ORDER — FUROSEMIDE 20 MG/1
20 TABLET ORAL DAILY
Qty: 30 | Refills: 0 | Status: COMPLETED | COMMUNITY
End: 2023-05-05

## 2023-05-05 RX ORDER — AMIODARONE HYDROCHLORIDE 200 MG/1
200 TABLET ORAL DAILY
Qty: 30 | Refills: 0 | Status: COMPLETED | COMMUNITY
End: 2023-05-05

## 2023-05-05 RX ORDER — POTASSIUM CHLORIDE 750 MG/1
10 CAPSULE, EXTENDED RELEASE ORAL
Qty: 30 | Refills: 0 | Status: COMPLETED | COMMUNITY
End: 2023-05-05

## 2023-05-10 ENCOUNTER — NON-APPOINTMENT (OUTPATIENT)
Age: 63
End: 2023-05-10

## 2023-05-19 ENCOUNTER — NON-APPOINTMENT (OUTPATIENT)
Age: 63
End: 2023-05-19

## 2023-05-25 ENCOUNTER — NON-APPOINTMENT (OUTPATIENT)
Age: 63
End: 2023-05-25

## 2023-06-22 ENCOUNTER — NON-APPOINTMENT (OUTPATIENT)
Age: 63
End: 2023-06-22

## 2023-07-21 ENCOUNTER — NON-APPOINTMENT (OUTPATIENT)
Age: 63
End: 2023-07-21

## 2023-10-16 ENCOUNTER — NON-APPOINTMENT (OUTPATIENT)
Age: 63
End: 2023-10-16

## (undated) DEVICE — SUT VICRYL 0 27" CT-3

## (undated) DEVICE — SOL ANTI FOG

## (undated) DEVICE — ELCTR STRYKER NEPTUNE SMOKE EVACUATION PENCIL (GREEN)

## (undated) DEVICE — DRAIN RESERVOIR FOR JACKSON PRATT 100CC CARDINAL

## (undated) DEVICE — ELCTR ZOLL DEFIBRILLATOR PAD NO REPLACEMENT

## (undated) DEVICE — KIT APPLICATOR SPRAY FOR HARVEST SYSTEM

## (undated) DEVICE — TROCAR COVIDIEN VERSAPORT BLADELESS OPTICAL 5MM STANDARD

## (undated) DEVICE — XI DRAPE COLUMN

## (undated) DEVICE — NDL HYPO SAFE 22G X 1.5" (BLACK)

## (undated) DEVICE — LUBRICANT INST ELECTROLUBE Z SOLUTION

## (undated) DEVICE — DRSG MEPILEX 10 X 25CM (4 X 10") AG

## (undated) DEVICE — CATH IV SAFE BC 24G X 0.75" (YELLOW)

## (undated) DEVICE — SUT NUROLON 1 18" OS-8 (POP-OFF)

## (undated) DEVICE — DRAPE FLUID WARMER 44 X 66"

## (undated) DEVICE — SUCTION CATH ARGYLE WHISTLE TIP 14FR STRAIGHT PACKED

## (undated) DEVICE — ELCTR BOVIE TIP NEEDLE INSULATED 6.5" EDGE

## (undated) DEVICE — PACK OPEN HEART LNX

## (undated) DEVICE — LOOP VASC SILICONE ELASTOMER W NDL 18G

## (undated) DEVICE — DRSG DERMABOND 0.7ML

## (undated) DEVICE — STABILIZR OCTOPUS NUVO

## (undated) DEVICE — ELCTR BOVIE TIP BLADE INSULATED 4" EDGE

## (undated) DEVICE — SUMP INTRACARDIAC/PERICARDIAL 20FR 1/4" ADULT

## (undated) DEVICE — TUBING SMOKE EVAC 3/8" X 10FT FOR NEPTUNE

## (undated) DEVICE — SUCTION CATH AIRLIFE CONTROL VALVE TRIFLO 14FR

## (undated) DEVICE — FOLEY TRAY 16FR 5CC LF LUBRISIL ADVANCE TEMP CLOSED

## (undated) DEVICE — ELCTR BOVIE TIP BLADE INSULATED 6.5" EDGE

## (undated) DEVICE — TISSUE STABILIZER MEDTRONIC OCTOPUS EVOLUTION

## (undated) DEVICE — SUT PROLENE 8-0 24" BV175-6

## (undated) DEVICE — GOWN XXL

## (undated) DEVICE — SYR LUER LOK 30CC

## (undated) DEVICE — SUT STAINLESS STEEL 7 4-18" CCS

## (undated) DEVICE — CATH NG SALEM SUMP 16FR

## (undated) DEVICE — SUT PROLENE 7-0 24" BV175-6

## (undated) DEVICE — XI SEAL UNIV 5- 8 MM

## (undated) DEVICE — PREP SCRUB BRUSH W CHG 4%

## (undated) DEVICE — STABILIZER W STABLESOFT II LS

## (undated) DEVICE — DRSG ALLEVYN LIFE 6 X 6 (PINK)

## (undated) DEVICE — DRAPE PROBE COVER 5" X 96"

## (undated) DEVICE — SUT STAINLESS STEEL 6 4-18" CCS

## (undated) DEVICE — DVC ASCOPE 4 SNGL USE SLIM

## (undated) DEVICE — Device

## (undated) DEVICE — DRSG RESTRAINT LIMB WRAP AROUND

## (undated) DEVICE — CHEST DRAIN PLEUR-EVAC DRY/WET ADULT-PEDS SINGLE (QUICK)

## (undated) DEVICE — ELCTR STRYKER EXTENSION SUCTION TIP 125MM

## (undated) DEVICE — PACK PROCEDURE HARVEST SMARTPREP APC-60

## (undated) DEVICE — MEDTRONIC URCHIN EVO HEART POSITIONER & CANISTER TUBING SET

## (undated) DEVICE — SUT MONOCRYL 4-0 27" PS-2 UNDYED

## (undated) DEVICE — SUT VICRYL 1 36" CTX UNDYED

## (undated) DEVICE — SYNOVIS VASCULAR PROBE 1.5MM 15CM

## (undated) DEVICE — CATH CV TRAY INSR ST UNIV

## (undated) DEVICE — SUT VICRYL 0 27" CT

## (undated) DEVICE — DRSG TRACH DRAINAGE 4X4

## (undated) DEVICE — POSITIONER FOAM EGG CRATE ULNAR 2PCS (PINK)

## (undated) DEVICE — CATH EPI  3 EYE CLOSED END 20G

## (undated) DEVICE — TUBING BRAT 2 SUCTION ASSEMBLY TWIST LOCK

## (undated) DEVICE — SUT ETHIBOND 0 18" TIES

## (undated) DEVICE — TUBING STRYKER PNEUMOCLEAR HIGH FLOW HEATED

## (undated) DEVICE — ELCTR DEFIB PAD PRO-PADZ W 10FT LEAD WIRES ADULT

## (undated) DEVICE — SUT DOUBLE 6 WIRE STERNAL

## (undated) DEVICE — DRSG BIOPATCH DISK W CHG 1" W 4.0MM HOLE

## (undated) DEVICE — CATH TRIOX OXIMETRY 8F 3 LUMENS

## (undated) DEVICE — BLOWER MISTER AXIUS WITH IV SET

## (undated) DEVICE — XI DRAPE ARM

## (undated) DEVICE — PACK PROC CV DRAPE

## (undated) DEVICE — SUT VICRYL 2-0 27" CT-1

## (undated) DEVICE — DRAPE IOBAN 33" X 23"

## (undated) DEVICE — D HELP - CLEARVIEW CLEARIFY SYSTEM

## (undated) DEVICE — SUT PROLENE 6-0 30" RB-2

## (undated) DEVICE — DRAPE TOWEL WHITE 17" X 24"